# Patient Record
Sex: FEMALE | Race: WHITE | NOT HISPANIC OR LATINO
[De-identification: names, ages, dates, MRNs, and addresses within clinical notes are randomized per-mention and may not be internally consistent; named-entity substitution may affect disease eponyms.]

---

## 2019-10-03 ENCOUNTER — APPOINTMENT (OUTPATIENT)
Dept: OTOLARYNGOLOGY | Facility: CLINIC | Age: 24
End: 2019-10-03

## 2019-10-08 ENCOUNTER — APPOINTMENT (OUTPATIENT)
Dept: OTOLARYNGOLOGY | Facility: CLINIC | Age: 24
End: 2019-10-08
Payer: COMMERCIAL

## 2019-10-08 VITALS
SYSTOLIC BLOOD PRESSURE: 111 MMHG | WEIGHT: 230 LBS | BODY MASS INDEX: 36.96 KG/M2 | DIASTOLIC BLOOD PRESSURE: 83 MMHG | HEART RATE: 67 BPM | HEIGHT: 66 IN

## 2019-10-08 DIAGNOSIS — Z80.9 FAMILY HISTORY OF MALIGNANT NEOPLASM, UNSPECIFIED: ICD-10-CM

## 2019-10-08 DIAGNOSIS — Z83.3 FAMILY HISTORY OF DIABETES MELLITUS: ICD-10-CM

## 2019-10-08 DIAGNOSIS — Z78.9 OTHER SPECIFIED HEALTH STATUS: ICD-10-CM

## 2019-10-08 PROCEDURE — 99204 OFFICE O/P NEW MOD 45 MIN: CPT

## 2019-10-08 NOTE — HISTORY OF PRESENT ILLNESS
[de-identified] : In early august patient was diagnosed with strep after having throat pain that was severe as well as thick muus and swelling of the tonsils. SHe was given two rounds of antibiotics even though her throat was never cultured and it took the second antibiotic for her throat ot improve. It returned about 2 weeks later so she went another doctor and SHe had a culture done this time which was negative. She was given doxycycline and sent to an ENT who said that she indeed needed the medication. SHe finished the doxycycline but 3 weeks ago everything returned. SHe had blood work done and another culture including a test for mono but all was clear. The most recent antibiotic was Augmentin. She frequently gets white mucus on the tonsils that looks like infection. SHe has minor throat tightness right now with swollen tonsils bilaterally. When she has a throat infection she gets moderate throat pain and foul smelling breath. SHe also has minor aer pain bilaterally that started with the throat pain

## 2019-10-08 NOTE — PROCEDURE
[FreeTextEntry1] : throat culture [FreeTextEntry2] : chronic tonsillitis  [FreeTextEntry3] : throat culture taken and sent out

## 2019-10-08 NOTE — PHYSICAL EXAM
[Midline] : trachea located in midline position [Normal] : no rashes [de-identified] : hypertorhic and cryptic

## 2019-10-08 NOTE — REVIEW OF SYSTEMS
[Throat Pain] : throat pain [Negative] : Heme/Lymph [de-identified] : c [de-identified] : chornic throat pain and infections

## 2019-10-08 NOTE — ASSESSMENT
[FreeTextEntry1] : Patient knows started at [facility] been suffering recurrent tonsil issues combination of stones viral infections of tongue exudative tonsillitis cultures of always been negative and examination she has 2 fairly large cryptic tonsils put her on a Medrol Dosepak and Flonase nasal spray after nasal endoscopy showed inflamed adenoid tissue as well she will followup and see us in one month and will determine at that time if any additional intervention would be indicated.

## 2019-10-15 LAB — BACTERIA THROAT CULT: NORMAL

## 2019-11-12 ENCOUNTER — TRANSCRIPTION ENCOUNTER (OUTPATIENT)
Age: 24
End: 2019-11-12

## 2019-11-12 ENCOUNTER — APPOINTMENT (OUTPATIENT)
Dept: OTOLARYNGOLOGY | Facility: CLINIC | Age: 24
End: 2019-11-12
Payer: COMMERCIAL

## 2019-11-12 VITALS
SYSTOLIC BLOOD PRESSURE: 125 MMHG | WEIGHT: 230 LBS | DIASTOLIC BLOOD PRESSURE: 81 MMHG | HEART RATE: 66 BPM | BODY MASS INDEX: 36.96 KG/M2 | HEIGHT: 66 IN

## 2019-11-12 DIAGNOSIS — J35.2 HYPERTROPHY OF ADENOIDS: ICD-10-CM

## 2019-11-12 PROCEDURE — 31575 DIAGNOSTIC LARYNGOSCOPY: CPT

## 2019-11-12 PROCEDURE — 99214 OFFICE O/P EST MOD 30 MIN: CPT | Mod: 25

## 2019-11-12 NOTE — ASSESSMENT
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 A fiberoptic evaluation of the larynx once again reassured her consideration for reflux evaluation should be considered not loosely I told her she should try to lose some weight. She does have a full base of tongue does not the symptoms that she is complaining about and I encouraged to followup with us as needed and to possibly seek some stress management help.

## 2019-11-12 NOTE — HISTORY OF PRESENT ILLNESS
[de-identified] : PAtient reports that the steroids helped to bring the swelling of the throat down but she continues ot have some random pain in the neck and throat. SHe still feels a tightness in the throat on occasion and has seen her PCP several times. SHe still feels like the tonsils look chronically infected. SHe does not have any current nasal congestion or runny nose . SHe has still been using her nasal sprays with moderate benefit. SHe states that the pain she is having in her throat now feels different. Its more on the outside of her neck and tender to touch the glands under the jaw. She did nto have this prior when she was having the frequent throat infections. SHe was worked up by the rheumatologist and was found to be ELODIA positive but other things were negative.

## 2019-11-12 NOTE — PHYSICAL EXAM
[Midline] : trachea located in midline position [Normal] : no rashes [de-identified] : hypertrophic  and cryptic

## 2019-12-05 ENCOUNTER — APPOINTMENT (OUTPATIENT)
Dept: OTOLARYNGOLOGY | Facility: CLINIC | Age: 24
End: 2019-12-05
Payer: COMMERCIAL

## 2019-12-05 VITALS
DIASTOLIC BLOOD PRESSURE: 86 MMHG | HEART RATE: 90 BPM | SYSTOLIC BLOOD PRESSURE: 127 MMHG | BODY MASS INDEX: 36.96 KG/M2 | WEIGHT: 230 LBS | HEIGHT: 66 IN

## 2019-12-05 DIAGNOSIS — M54.2 CERVICALGIA: ICD-10-CM

## 2019-12-05 PROCEDURE — 99214 OFFICE O/P EST MOD 30 MIN: CPT | Mod: 25

## 2019-12-05 PROCEDURE — 31575 DIAGNOSTIC LARYNGOSCOPY: CPT

## 2019-12-05 NOTE — PHYSICAL EXAM
[Midline] : trachea located in midline position [Normal] : no rashes [de-identified] : hypertrophic  and cryptic

## 2019-12-05 NOTE — ASSESSMENT
[FreeTextEntry1] : Patient follows up doing much better at work however continues to have some discomfort comfort in her anterior neck cousin recently diagnosed with thyroid cancer obviously increased to concern she's here for evaluation no palpable masses or in the neck identifiable however she has a very thick neck and [facility] ultrasound of her thyroid consideration for a CAT scan will be given if any abnormality is found on the thyroid scan. Fiberoptic evaluation of the larynx at her request once again showed no evidence of any abnormality just severely very prominent normal-appearing lingual tonsil region. We discussed as follows she is negative and anterior neck pain continues consideration for neurological orthopedic evaluation should be considered.

## 2019-12-05 NOTE — HISTORY OF PRESENT ILLNESS
[de-identified] : Patient continues to have throat pain and anterior neck pain after 2 months. The antibitoics and steroids she took back in October only temporarily helped but did not change or improve her neck tenderness. SHe had her blood testing for the thyroid last month and was told that all was normal but she still feels like something is stuck in her throat. SHe continues to have pain in the front of the throat/neck everyday for the last 2 months. She states that the pain moves and is sometimes higher in the neck, and then lower near the collarbones. SHe has a throbbing in the neck mostly and then randomly will get pain that is sharp and stabbing. THere is associated ear pain that comes and goes as well when she has the pain in the throat and this pain is also sharp and stabbing when it happens. She mostly feels like someone is pressing two fingers against her trachea

## 2019-12-08 ENCOUNTER — FORM ENCOUNTER (OUTPATIENT)
Age: 24
End: 2019-12-08

## 2019-12-09 ENCOUNTER — APPOINTMENT (OUTPATIENT)
Dept: ULTRASOUND IMAGING | Facility: CLINIC | Age: 24
End: 2019-12-09
Payer: COMMERCIAL

## 2019-12-09 ENCOUNTER — OUTPATIENT (OUTPATIENT)
Dept: OUTPATIENT SERVICES | Facility: HOSPITAL | Age: 24
LOS: 1 days | End: 2019-12-09
Payer: COMMERCIAL

## 2019-12-09 DIAGNOSIS — Z00.8 ENCOUNTER FOR OTHER GENERAL EXAMINATION: ICD-10-CM

## 2019-12-09 PROCEDURE — 76536 US EXAM OF HEAD AND NECK: CPT

## 2019-12-09 PROCEDURE — 76536 US EXAM OF HEAD AND NECK: CPT | Mod: 26

## 2019-12-10 ENCOUNTER — CLINICAL ADVICE (OUTPATIENT)
Age: 24
End: 2019-12-10

## 2019-12-16 ENCOUNTER — TRANSCRIPTION ENCOUNTER (OUTPATIENT)
Age: 24
End: 2019-12-16

## 2019-12-18 ENCOUNTER — APPOINTMENT (OUTPATIENT)
Dept: ENDOCRINOLOGY | Facility: CLINIC | Age: 24
End: 2019-12-18

## 2019-12-24 ENCOUNTER — APPOINTMENT (OUTPATIENT)
Dept: ENDOCRINOLOGY | Facility: CLINIC | Age: 24
End: 2019-12-24

## 2020-01-05 ENCOUNTER — TRANSCRIPTION ENCOUNTER (OUTPATIENT)
Age: 25
End: 2020-01-05

## 2020-01-08 ENCOUNTER — RESULT REVIEW (OUTPATIENT)
Age: 25
End: 2020-01-08

## 2020-02-05 ENCOUNTER — TRANSCRIPTION ENCOUNTER (OUTPATIENT)
Age: 25
End: 2020-02-05

## 2020-02-11 ENCOUNTER — APPOINTMENT (OUTPATIENT)
Dept: OTOLARYNGOLOGY | Facility: CLINIC | Age: 25
End: 2020-02-11
Payer: COMMERCIAL

## 2020-02-11 VITALS
DIASTOLIC BLOOD PRESSURE: 105 MMHG | HEIGHT: 66 IN | WEIGHT: 230 LBS | SYSTOLIC BLOOD PRESSURE: 145 MMHG | HEART RATE: 85 BPM | BODY MASS INDEX: 36.96 KG/M2

## 2020-02-11 PROCEDURE — 31575 DIAGNOSTIC LARYNGOSCOPY: CPT

## 2020-02-11 PROCEDURE — 99214 OFFICE O/P EST MOD 30 MIN: CPT | Mod: 25

## 2020-02-11 NOTE — REVIEW OF SYSTEMS
[Nasal Congestion] : nasal congestion [Throat Pain] : throat pain [Ear Pain] : ear pain [Negative] : Heme/Lymph

## 2020-02-11 NOTE — ASSESSMENT
[FreeTextEntry1] : Patient with what appears to be an exudative tonsillitis been on Augmentin now for 5 days pain is still bothersome on examination she does have exudative tonsillitis bilaterally she apparently had a model ruled out about 2 weeks ago in the past this is responded to steroids as well again were Medrol Dosepak if this were to persist we may switch her to clindamycin we once again had a discussion about possible tonsillectomy in the future fiberoptic evaluation of the larynx showed no extension of parapharyngeal space lingual tonsils and epiglottis all within normal limits.

## 2020-02-11 NOTE — PHYSICAL EXAM
[Midline] : trachea located in midline position [Normal] : orientation to person, place, and time: normal [de-identified] : minor edema of the left cervical II lymph nodes  [de-identified] : purulence bilaterally with edema and erythema

## 2020-02-11 NOTE — HISTORY OF PRESENT ILLNESS
[de-identified] : Patient went to her doctor on monday for throat pain on the left side that was moderate and she had swelling of the nodes on the left side of the nexk as well. Her PCP didn’t think anything of this but she woke up the next morning with pus on both tonsils and severe throat pain. She was put on augmentin on Tuesday and had been taking it twice a day for a week and still feels severe throat pain bilaterally that is sharp and stabbing with swallowing. THe pain is radiating to the ears bilaterally with a minor headache. SHe has nasal congsetion as well so she has been mouth breathing which makes the throat dry and makes it hard to sleep. SHe still has mionr swelling of the left neck. They did a rapid strep at her doctors office that was negative. She had a mono test done two weeks ago (blood work) that came back negative as well.

## 2020-02-26 ENCOUNTER — EMERGENCY (EMERGENCY)
Facility: HOSPITAL | Age: 25
LOS: 1 days | Discharge: HOME | End: 2020-02-26
Admitting: EMERGENCY MEDICINE
Payer: COMMERCIAL

## 2020-02-26 VITALS
SYSTOLIC BLOOD PRESSURE: 133 MMHG | OXYGEN SATURATION: 100 % | RESPIRATION RATE: 18 BRPM | HEART RATE: 91 BPM | DIASTOLIC BLOOD PRESSURE: 80 MMHG | TEMPERATURE: 98 F

## 2020-02-26 LAB
ALBUMIN SERPL ELPH-MCNC: 4.5 G/DL — SIGNIFICANT CHANGE UP (ref 3.3–5)
ALP SERPL-CCNC: 49 U/L — SIGNIFICANT CHANGE UP (ref 40–120)
ALT FLD-CCNC: 24 U/L — SIGNIFICANT CHANGE UP (ref 4–33)
ANION GAP SERPL CALC-SCNC: 13 MMO/L — SIGNIFICANT CHANGE UP (ref 7–14)
APPEARANCE UR: CLEAR — SIGNIFICANT CHANGE UP
AST SERPL-CCNC: 24 U/L — SIGNIFICANT CHANGE UP (ref 4–32)
BASOPHILS # BLD AUTO: 0.03 K/UL — SIGNIFICANT CHANGE UP (ref 0–0.2)
BASOPHILS NFR BLD AUTO: 0.5 % — SIGNIFICANT CHANGE UP (ref 0–2)
BILIRUB SERPL-MCNC: 0.6 MG/DL — SIGNIFICANT CHANGE UP (ref 0.2–1.2)
BILIRUB UR-MCNC: NEGATIVE — SIGNIFICANT CHANGE UP
BLOOD UR QL VISUAL: NEGATIVE — SIGNIFICANT CHANGE UP
BUN SERPL-MCNC: 17 MG/DL — SIGNIFICANT CHANGE UP (ref 7–23)
CALCIUM SERPL-MCNC: 9.2 MG/DL — SIGNIFICANT CHANGE UP (ref 8.4–10.5)
CHLORIDE SERPL-SCNC: 105 MMOL/L — SIGNIFICANT CHANGE UP (ref 98–107)
CO2 SERPL-SCNC: 21 MMOL/L — LOW (ref 22–31)
COLOR SPEC: YELLOW — SIGNIFICANT CHANGE UP
CREAT SERPL-MCNC: 0.85 MG/DL — SIGNIFICANT CHANGE UP (ref 0.5–1.3)
EOSINOPHIL # BLD AUTO: 0.02 K/UL — SIGNIFICANT CHANGE UP (ref 0–0.5)
EOSINOPHIL NFR BLD AUTO: 0.3 % — SIGNIFICANT CHANGE UP (ref 0–6)
GLUCOSE SERPL-MCNC: 87 MG/DL — SIGNIFICANT CHANGE UP (ref 70–99)
GLUCOSE UR-MCNC: NEGATIVE — SIGNIFICANT CHANGE UP
HCT VFR BLD CALC: 37.8 % — SIGNIFICANT CHANGE UP (ref 34.5–45)
HGB BLD-MCNC: 13.1 G/DL — SIGNIFICANT CHANGE UP (ref 11.5–15.5)
IMM GRANULOCYTES NFR BLD AUTO: 0.3 % — SIGNIFICANT CHANGE UP (ref 0–1.5)
KETONES UR-MCNC: NEGATIVE — SIGNIFICANT CHANGE UP
LEUKOCYTE ESTERASE UR-ACNC: NEGATIVE — SIGNIFICANT CHANGE UP
LYMPHOCYTES # BLD AUTO: 2.88 K/UL — SIGNIFICANT CHANGE UP (ref 1–3.3)
LYMPHOCYTES # BLD AUTO: 45.4 % — HIGH (ref 13–44)
MCHC RBC-ENTMCNC: 30.5 PG — SIGNIFICANT CHANGE UP (ref 27–34)
MCHC RBC-ENTMCNC: 34.7 % — SIGNIFICANT CHANGE UP (ref 32–36)
MCV RBC AUTO: 87.9 FL — SIGNIFICANT CHANGE UP (ref 80–100)
MONOCYTES # BLD AUTO: 0.65 K/UL — SIGNIFICANT CHANGE UP (ref 0–0.9)
MONOCYTES NFR BLD AUTO: 10.2 % — SIGNIFICANT CHANGE UP (ref 2–14)
NEUTROPHILS # BLD AUTO: 2.75 K/UL — SIGNIFICANT CHANGE UP (ref 1.8–7.4)
NEUTROPHILS NFR BLD AUTO: 43.3 % — SIGNIFICANT CHANGE UP (ref 43–77)
NITRITE UR-MCNC: NEGATIVE — SIGNIFICANT CHANGE UP
NRBC # FLD: 0 K/UL — SIGNIFICANT CHANGE UP (ref 0–0)
PH UR: 6 — SIGNIFICANT CHANGE UP (ref 5–8)
PLATELET # BLD AUTO: 205 K/UL — SIGNIFICANT CHANGE UP (ref 150–400)
PMV BLD: 9.1 FL — SIGNIFICANT CHANGE UP (ref 7–13)
POTASSIUM SERPL-MCNC: 3.8 MMOL/L — SIGNIFICANT CHANGE UP (ref 3.5–5.3)
POTASSIUM SERPL-SCNC: 3.8 MMOL/L — SIGNIFICANT CHANGE UP (ref 3.5–5.3)
PROT SERPL-MCNC: 7.6 G/DL — SIGNIFICANT CHANGE UP (ref 6–8.3)
PROT UR-MCNC: 100 — HIGH
RBC # BLD: 4.3 M/UL — SIGNIFICANT CHANGE UP (ref 3.8–5.2)
RBC # FLD: 13.2 % — SIGNIFICANT CHANGE UP (ref 10.3–14.5)
SODIUM SERPL-SCNC: 139 MMOL/L — SIGNIFICANT CHANGE UP (ref 135–145)
SP GR SPEC: 1.03 — SIGNIFICANT CHANGE UP (ref 1–1.04)
UROBILINOGEN FLD QL: 0.2 — SIGNIFICANT CHANGE UP
WBC # BLD: 6.35 K/UL — SIGNIFICANT CHANGE UP (ref 3.8–10.5)
WBC # FLD AUTO: 6.35 K/UL — SIGNIFICANT CHANGE UP (ref 3.8–10.5)
WBC UR QL: SIGNIFICANT CHANGE UP (ref 0–?)

## 2020-02-26 PROCEDURE — 76830 TRANSVAGINAL US NON-OB: CPT | Mod: 26

## 2020-02-26 PROCEDURE — 99284 EMERGENCY DEPT VISIT MOD MDM: CPT

## 2020-02-26 NOTE — ED PROVIDER NOTE - CLINICAL SUMMARY MEDICAL DECISION MAKING FREE TEXT BOX
23 yo female c no sig pmhx presents to ED c/o lower abdominal cramping x 1.5 weeks.  no associated GI symptoms; pt recently had IUP expelled, +mild tenderness to suprapubic region, will obtain labs, ua, transvaginal US, pt did not want anything for pain at this time.

## 2020-02-26 NOTE — ED PROVIDER NOTE - NSFOLLOWUPINSTRUCTIONS_ED_ALL_ED_FT
Take motrin 600mg every 8 hours with food as needed for pain.  follow up with your GYN within 1-2 weeks if pain persists.  Return to ED for any fever, vomiting or worsening abdominal pain.

## 2020-02-26 NOTE — ED PROVIDER NOTE - PATIENT PORTAL LINK FT
You can access the FollowMyHealth Patient Portal offered by [facility] by registering at the following website: [email or web address]. By joining [employer]’s FollowMyHealth portal, you will also be able to view your health information using other applications (apps) compatible with our system.

## 2020-02-26 NOTE — ED PROVIDER NOTE - OBJECTIVE STATEMENT
25 yo female c no sig pmhx presents to ED c/o lower abdominal cramping x 1.5 weeks.  Pt states had a tonisllitis 2 weeks ago finished course of antibx and steroids.  Pt also states 2 weeks ago had her IUD "expelled" out of her uterus which prompted her GYN to take it out.  LMP was 2/8 which pt states was heavier and last longer than usual.  Currently pain 6/10 and tolerable.  Pt denies any fevers, n/v/d, flank pain, urinary sx, vaginal bleeding or discharge.

## 2020-03-04 ENCOUNTER — APPOINTMENT (OUTPATIENT)
Dept: DERMATOLOGY | Facility: CLINIC | Age: 25
End: 2020-03-04
Payer: COMMERCIAL

## 2020-03-04 ENCOUNTER — LABORATORY RESULT (OUTPATIENT)
Age: 25
End: 2020-03-04

## 2020-03-04 VITALS — HEIGHT: 66 IN | WEIGHT: 215 LBS | BODY MASS INDEX: 34.55 KG/M2

## 2020-03-04 DIAGNOSIS — Z12.83 ENCOUNTER FOR SCREENING FOR MALIGNANT NEOPLASM OF SKIN: ICD-10-CM

## 2020-03-04 DIAGNOSIS — Z87.898 PERSONAL HISTORY OF OTHER SPECIFIED CONDITIONS: ICD-10-CM

## 2020-03-04 PROCEDURE — 12001 RPR S/N/AX/GEN/TRNK 2.5CM/<: CPT | Mod: GC,59

## 2020-03-04 PROCEDURE — 99203 OFFICE O/P NEW LOW 30 MIN: CPT | Mod: GC,25

## 2020-03-04 PROCEDURE — 11402 EXC TR-EXT B9+MARG 1.1-2 CM: CPT | Mod: GC,59

## 2020-03-11 ENCOUNTER — TRANSCRIPTION ENCOUNTER (OUTPATIENT)
Age: 25
End: 2020-03-11

## 2020-03-18 ENCOUNTER — APPOINTMENT (OUTPATIENT)
Dept: DERMATOLOGY | Facility: CLINIC | Age: 25
End: 2020-03-18
Payer: COMMERCIAL

## 2020-03-18 VITALS — HEIGHT: 66 IN | BODY MASS INDEX: 34.55 KG/M2 | WEIGHT: 215 LBS

## 2020-03-18 DIAGNOSIS — Z86.018 PERSONAL HISTORY OF OTHER BENIGN NEOPLASM: ICD-10-CM

## 2020-03-18 DIAGNOSIS — D22.62 MELANOCYTIC NEVI OF LEFT UPPER LIMB, INCLUDING SHOULDER: ICD-10-CM

## 2020-03-18 DIAGNOSIS — D22.9 MELANOCYTIC NEVI, UNSPECIFIED: ICD-10-CM

## 2020-03-18 DIAGNOSIS — L72.0 EPIDERMAL CYST: ICD-10-CM

## 2020-03-18 PROCEDURE — 99213 OFFICE O/P EST LOW 20 MIN: CPT | Mod: 24

## 2020-04-25 ENCOUNTER — MESSAGE (OUTPATIENT)
Age: 25
End: 2020-04-25

## 2020-04-27 ENCOUNTER — TRANSCRIPTION ENCOUNTER (OUTPATIENT)
Age: 25
End: 2020-04-27

## 2020-05-17 ENCOUNTER — TRANSCRIPTION ENCOUNTER (OUTPATIENT)
Age: 25
End: 2020-05-17

## 2020-05-18 ENCOUNTER — APPOINTMENT (OUTPATIENT)
Dept: DISASTER EMERGENCY | Facility: CLINIC | Age: 25
End: 2020-05-18

## 2020-05-18 ENCOUNTER — LABORATORY RESULT (OUTPATIENT)
Age: 25
End: 2020-05-18

## 2020-05-22 LAB
SARS-COV-2 IGG SERPL IA-ACNC: 0 INDEX
SARS-COV-2 IGG SERPL QL IA: NEGATIVE

## 2020-05-26 ENCOUNTER — APPOINTMENT (OUTPATIENT)
Dept: DISASTER EMERGENCY | Facility: HOSPITAL | Age: 25
End: 2020-05-26

## 2020-09-14 ENCOUNTER — TRANSCRIPTION ENCOUNTER (OUTPATIENT)
Age: 25
End: 2020-09-14

## 2020-09-22 ENCOUNTER — APPOINTMENT (OUTPATIENT)
Dept: OTOLARYNGOLOGY | Facility: CLINIC | Age: 25
End: 2020-09-22
Payer: COMMERCIAL

## 2020-09-22 VITALS
WEIGHT: 210 LBS | TEMPERATURE: 98.1 F | HEART RATE: 92 BPM | DIASTOLIC BLOOD PRESSURE: 96 MMHG | HEIGHT: 66 IN | SYSTOLIC BLOOD PRESSURE: 146 MMHG | BODY MASS INDEX: 33.75 KG/M2

## 2020-09-22 DIAGNOSIS — H92.03 OTALGIA, BILATERAL: ICD-10-CM

## 2020-09-22 DIAGNOSIS — H93.13 TINNITUS, BILATERAL: ICD-10-CM

## 2020-09-22 PROCEDURE — 31575 DIAGNOSTIC LARYNGOSCOPY: CPT

## 2020-09-22 PROCEDURE — 92567 TYMPANOMETRY: CPT

## 2020-09-22 PROCEDURE — 99214 OFFICE O/P EST MOD 30 MIN: CPT | Mod: 25

## 2020-09-22 PROCEDURE — 92557 COMPREHENSIVE HEARING TEST: CPT

## 2020-09-22 NOTE — PHYSICAL EXAM
[Midline] : trachea located in midline position [Normal] : palatine tonsils are normal [de-identified] : minor erythem and edema

## 2020-09-22 NOTE — ASSESSMENT
[FreeTextEntry1] : Patient complaining of some once again throat discomfort she got better on steroids and antibiotics but once again after that symptoms seem to have recurred fiberoptically once again she is remains irritated so I gave her another course of Augmentin as well as a Medrol Dosepak to see if this will nip this in the bud.  She also is complaining of some discomfort in her ear seems to be TMJ related told her to take some Motrin or Advil we did an audiogram which confirm essentially normal hearing.  I reassured her there is no evidence of any herpes or any other viral issues in her throat which alleviated some of her concerns.

## 2020-09-22 NOTE — HISTORY OF PRESENT ILLNESS
[de-identified] : Patient has had progressively worsening bilateral ear pain for the last few weeks. The left ear is worse than the right. The pain is sharp an d stabbing and it can lasts for several hours. SHe has never been told that she has TMJ and does not know if she grinds her teeth. SHe has not had any pulsations in the ears or dizziness and denies any issues with ear drainage. She does admit that when she has the severe ear pain there is an associated high pitched ringing in both ears that is intermittent. SHe has not had any recent throat pain or throat infections but still feelf ullness all the time

## 2020-10-10 ENCOUNTER — TRANSCRIPTION ENCOUNTER (OUTPATIENT)
Age: 25
End: 2020-10-10

## 2020-10-12 ENCOUNTER — APPOINTMENT (OUTPATIENT)
Dept: OTOLARYNGOLOGY | Facility: CLINIC | Age: 25
End: 2020-10-12
Payer: COMMERCIAL

## 2020-10-12 VITALS
BODY MASS INDEX: 33.75 KG/M2 | SYSTOLIC BLOOD PRESSURE: 134 MMHG | DIASTOLIC BLOOD PRESSURE: 87 MMHG | HEART RATE: 103 BPM | HEIGHT: 66 IN | WEIGHT: 210 LBS | TEMPERATURE: 98 F

## 2020-10-12 PROCEDURE — 99214 OFFICE O/P EST MOD 30 MIN: CPT

## 2020-10-12 NOTE — HISTORY OF PRESENT ILLNESS
[de-identified] : Patient was at work two nights ago and she started to have moderate throat pain. SHe feels like it is enlarged and hard to swallow since this started. This has happened now several times a year, every year. SHe does not have any issues with eating or drinking but it is uncomfortable. She finished antibiotics and steroids for her ear about two weeeks ago and had been doing well

## 2020-10-12 NOTE — ASSESSMENT
[FreeTextEntry1] : Can with what appears to be a viral tonsillitis happened fairly rapidly bilaterally exudative with some hemorrhagic regions we did a culture to cultures 1 for bacterial and 1 for potential gonorrhea and chlamydia.  As well as sent her for mono test.  Put her on Augmentin once again as well as a Medrol Dosepak that seemed to have worked for her in the past obviously consideration for tonsillectomy will be given soon when she heals..

## 2020-10-12 NOTE — PHYSICAL EXAM
[Midline] : trachea located in midline position [Normal] : no rashes [de-identified] : hyoertrophic, edematous

## 2020-10-15 LAB — BACTERIA THROAT CULT: NORMAL

## 2020-10-21 ENCOUNTER — TRANSCRIPTION ENCOUNTER (OUTPATIENT)
Age: 25
End: 2020-10-21

## 2020-10-21 LAB — HETEROPH AB SER QL: NEGATIVE

## 2020-10-26 ENCOUNTER — APPOINTMENT (OUTPATIENT)
Dept: OTOLARYNGOLOGY | Facility: CLINIC | Age: 25
End: 2020-10-26
Payer: COMMERCIAL

## 2020-10-26 VITALS
TEMPERATURE: 97.2 F | SYSTOLIC BLOOD PRESSURE: 136 MMHG | DIASTOLIC BLOOD PRESSURE: 90 MMHG | BODY MASS INDEX: 33.75 KG/M2 | HEIGHT: 66 IN | WEIGHT: 210 LBS

## 2020-10-26 PROCEDURE — 99214 OFFICE O/P EST MOD 30 MIN: CPT

## 2020-10-26 PROCEDURE — 99072 ADDL SUPL MATRL&STAF TM PHE: CPT

## 2020-10-26 NOTE — HISTORY OF PRESENT ILLNESS
[de-identified] : Patient took the steroids and the antibiotics after the last visit and was doing well but then this past saturday she started to have severe throat pain again. She has sharp stabbing with swallowing  and called the on call service and was re prescribed medication that she has not started yet. She has been taking motrin for pain since this past saturday but continues to have throat pain.

## 2020-10-26 NOTE — ASSESSMENT
[FreeTextEntry1] : And what appears to be a viral pharyngitis culture was taken of her tonsils that do not look exudative Motrin seems to be helping her a lot pain voice we gave she was given antibiotics and steroids again over the weekend she held off I encouraged her to hold off until the culture is back most likely we may proceed to taking out her tonsils in the near future and she understands that will guarantee that viral pharyngitis is well come back but may certainly diminish the frequency.

## 2020-10-26 NOTE — PHYSICAL EXAM
[Midline] : trachea located in midline position [Normal] : no rashes [de-identified] : erythematous bilaterally

## 2020-10-28 ENCOUNTER — TRANSCRIPTION ENCOUNTER (OUTPATIENT)
Age: 25
End: 2020-10-28

## 2020-10-30 LAB — BACTERIA THROAT CULT: NORMAL

## 2020-11-19 ENCOUNTER — TRANSCRIPTION ENCOUNTER (OUTPATIENT)
Age: 25
End: 2020-11-19

## 2020-12-01 ENCOUNTER — TRANSCRIPTION ENCOUNTER (OUTPATIENT)
Age: 25
End: 2020-12-01

## 2020-12-08 ENCOUNTER — TRANSCRIPTION ENCOUNTER (OUTPATIENT)
Age: 25
End: 2020-12-08

## 2020-12-20 ENCOUNTER — TRANSCRIPTION ENCOUNTER (OUTPATIENT)
Age: 25
End: 2020-12-20

## 2020-12-30 ENCOUNTER — TRANSCRIPTION ENCOUNTER (OUTPATIENT)
Age: 25
End: 2020-12-30

## 2021-01-19 ENCOUNTER — APPOINTMENT (OUTPATIENT)
Dept: OTOLARYNGOLOGY | Facility: CLINIC | Age: 26
End: 2021-01-19
Payer: COMMERCIAL

## 2021-01-19 VITALS
SYSTOLIC BLOOD PRESSURE: 144 MMHG | HEIGHT: 66 IN | TEMPERATURE: 98 F | DIASTOLIC BLOOD PRESSURE: 97 MMHG | BODY MASS INDEX: 33.75 KG/M2 | WEIGHT: 210 LBS | HEART RATE: 107 BPM

## 2021-01-19 DIAGNOSIS — J35.1 HYPERTROPHY OF TONSILS: ICD-10-CM

## 2021-01-19 PROCEDURE — 99214 OFFICE O/P EST MOD 30 MIN: CPT

## 2021-01-19 PROCEDURE — 99072 ADDL SUPL MATRL&STAF TM PHE: CPT

## 2021-01-19 NOTE — PHYSICAL EXAM
[Midline] : trachea located in midline position [Normal] : no rashes [de-identified] : erythematous bilaterally

## 2021-01-19 NOTE — HISTORY OF PRESENT ILLNESS
[de-identified] : Patient finally improved after the last severe throst infection in October. SHe has continued to have recurrent throat pain and it radiates to the ears on occasion. SHe continues to take antibitoics when she has an active infection but the symptoms always return.

## 2021-01-19 NOTE — ASSESSMENT
[FreeTextEntry1] : PAtient returns with continued throat pain on and off since October. She has had constant throat infections and recurrent throat discomfort and swelling since we first saw her in 2019. Today on physical examination her tonsils appear chronically infected and unhealthy. As a result of the chronic nature of this issue discussed options with patient of continuing to treat only active infections with antibnitoics and or steroids, or to move forward with tonsillectomy. We both agree that at this point she would benefit from tonsil removal so we will get the ball rolling to get it approved by insurance. In the meantime, we gave her another round of augmentin and steroids as a last effort to reduce the swelling and irritation of the tonsils.  This discussed also risks and benefits of surgery in the near future including pros and cons of another course of antibiotics and steroids in great detail.

## 2021-01-19 NOTE — REVIEW OF SYSTEMS
[Throat Pain] : throat pain [Negative] : Heme/Lymph [de-identified] : recurrent throat infections

## 2021-01-23 ENCOUNTER — TRANSCRIPTION ENCOUNTER (OUTPATIENT)
Age: 26
End: 2021-01-23

## 2021-01-25 ENCOUNTER — TRANSCRIPTION ENCOUNTER (OUTPATIENT)
Age: 26
End: 2021-01-25

## 2021-02-08 ENCOUNTER — TRANSCRIPTION ENCOUNTER (OUTPATIENT)
Age: 26
End: 2021-02-08

## 2021-02-15 ENCOUNTER — TRANSCRIPTION ENCOUNTER (OUTPATIENT)
Age: 26
End: 2021-02-15

## 2021-02-24 ENCOUNTER — NON-APPOINTMENT (OUTPATIENT)
Age: 26
End: 2021-02-24

## 2021-03-19 ENCOUNTER — OUTPATIENT (OUTPATIENT)
Dept: OUTPATIENT SERVICES | Facility: HOSPITAL | Age: 26
LOS: 1 days | End: 2021-03-19
Payer: COMMERCIAL

## 2021-03-19 VITALS
HEART RATE: 97 BPM | TEMPERATURE: 98 F | WEIGHT: 225.09 LBS | DIASTOLIC BLOOD PRESSURE: 84 MMHG | SYSTOLIC BLOOD PRESSURE: 126 MMHG | OXYGEN SATURATION: 97 % | RESPIRATION RATE: 16 BRPM | HEIGHT: 66 IN

## 2021-03-19 DIAGNOSIS — J35.1 HYPERTROPHY OF TONSILS: ICD-10-CM

## 2021-03-19 DIAGNOSIS — N75.0 CYST OF BARTHOLIN'S GLAND: Chronic | ICD-10-CM

## 2021-03-19 DIAGNOSIS — R00.2 PALPITATIONS: ICD-10-CM

## 2021-03-19 LAB
HCG UR QL: NEGATIVE — SIGNIFICANT CHANGE UP
HCT VFR BLD CALC: 40.4 % — SIGNIFICANT CHANGE UP (ref 34.5–45)
HGB BLD-MCNC: 14.1 G/DL — SIGNIFICANT CHANGE UP (ref 11.5–15.5)
MCHC RBC-ENTMCNC: 30.5 PG — SIGNIFICANT CHANGE UP (ref 27–34)
MCHC RBC-ENTMCNC: 34.9 GM/DL — SIGNIFICANT CHANGE UP (ref 32–36)
MCV RBC AUTO: 87.3 FL — SIGNIFICANT CHANGE UP (ref 80–100)
NRBC # BLD: 0 /100 WBCS — SIGNIFICANT CHANGE UP
NRBC # FLD: 0 K/UL — SIGNIFICANT CHANGE UP
PLATELET # BLD AUTO: 264 K/UL — SIGNIFICANT CHANGE UP (ref 150–400)
RBC # BLD: 4.63 M/UL — SIGNIFICANT CHANGE UP (ref 3.8–5.2)
RBC # FLD: 12.7 % — SIGNIFICANT CHANGE UP (ref 10.3–14.5)
WBC # BLD: 10.61 K/UL — HIGH (ref 3.8–10.5)
WBC # FLD AUTO: 10.61 K/UL — HIGH (ref 3.8–10.5)

## 2021-03-19 PROCEDURE — 93010 ELECTROCARDIOGRAM REPORT: CPT

## 2021-03-19 NOTE — H&P PST ADULT - NSICDXFAMILYHX_GEN_ALL_CORE_FT
FAMILY HISTORY:  Father  Still living? Unknown  FH: HTN (hypertension), Age at diagnosis: Age Unknown    Mother  Still living? Unknown  Family history of scleroderma, Age at diagnosis: Age Unknown  FH: HTN (hypertension), Age at diagnosis: Age Unknown

## 2021-03-19 NOTE — H&P PST ADULT - NSICDXPASTMEDICALHX_GEN_ALL_CORE_FT
PAST MEDICAL HISTORY:  Erythema multiforme bullosum (Melchor Long syndrome) age 8    H/O tonsillitis      PAST MEDICAL HISTORY:  Erythema multiforme bullosum (Melchor Long syndrome) age 8    H/O tonsillitis     Obesity

## 2021-03-19 NOTE — H&P PST ADULT - CARDIOVASCULAR COMMENTS
occasional palpitations since 4/2020 - had evaluation by cardiologist (stress/echo/holtor monitor) and states findings were normal

## 2021-03-19 NOTE — H&P PST ADULT - HISTORY OF PRESENT ILLNESS
25 year old female with c/o frequent tonsillitis over the past 1.5 years. Pt presents today for presurgical evaluation for .. 25 year old female with c/o frequent tonsillitis over the past 1.5 years. Pt presents today for presurgical evaluation for Tonsillectomy.

## 2021-03-19 NOTE — H&P PST ADULT - NSICDXPROBLEM_GEN_ALL_CORE_FT
PROBLEM DIAGNOSES  Problem: Hypertrophy of tonsils  Assessment and Plan: Pt scheduled for surgery on 3/31/2021.  Pre-op instructions provided. Pt verbalized understanding.   Pepcid provided for GI prophylaxis.   Pt given urine specimen cup for ucg on admission.   Pt states she is already scheduled for preop COVID testing.        PROBLEM DIAGNOSES  Problem: Hypertrophy of tonsils  Assessment and Plan: Pt scheduled for surgery on 3/31/2021.  Pre-op instructions provided. Pt verbalized understanding.   Pepcid provided for GI prophylaxis.   Pt given urine specimen cup for ucg on admission.   Pt states she is already scheduled for preop COVID testing.     Problem: Palpitations  Assessment and Plan: Already had evaluation by cardiologist - requested last visit note.

## 2021-03-19 NOTE — H&P PST ADULT - TOBACCO USE
Campos Banerjee Patient Age: 8 year old  MESSAGE:   Pt Mother calling requesting to speak to a nurse.     Mother states that pt saw Dr. Doss today 11-05-19. Mother states that pharmacy gave her the nebulizer solution but not the inhaler. Mother would like to confirm medication for pt.     Message confirmed with caller    Call connected to [id number] Triage queue.  Routed to Provider's clinical pool.     WEIGHT AND HEIGHT:   Wt Readings from Last 1 Encounters:   11/05/19 24.2 kg (53 lb 6.4 oz) (65 %, Z= 0.40)*     * Growth percentiles are based on CDC (Boys, 2-20 Years) data.     Ht Readings from Last 1 Encounters:   07/09/18 3' 9\" (1.143 m) (64 %, Z= 0.35)*     * Growth percentiles are based on CDC (Boys, 2-20 Years) data.     BMI Readings from Last 1 Encounters:   07/09/18 17.36 kg/m² (90 %, Z= 1.28)*     * Growth percentiles are based on CDC (Boys, 2-20 Years) data.       ALLERGIES: no known allergies.  Current Outpatient Medications   Medication   • albuterol (VENTOLIN) (2.5 MG/3ML) 0.083% nebulizer solution   • Spacer/Aero-Hold Chamber Mask Misc   • amoxicillin-clavulanate (AUGMENTIN) 400-57 MG/5ML suspension   • prednisoLONE sod-phos (ORAPRED) 15 MG/5ML solution   • Spacer/Aero-Holding Chambers (AEROCHAMBER PLUS [facility] PEDIATRIC WITH MASK)     No current facility-administered medications for this visit.      PHARMACY to use:           Pharmacy preference(s) on file:   [id number] - [facility] [address]  [address] [address]  Phone: [phone] Fax: [phone]      CALL BACK INFO: Ok to leave response (including medical information) on answering machine  ROUTING: Patient's physician/staff        PCP: Chasidy Mejía MD         INS: Payor: [facility] / Plan: [id number] / Product Type: O MISC   PATIENT ADDRESS:  [address] [place] [id number]   Never smoker

## 2021-03-19 NOTE — H&P PST ADULT - LIVES WITH, PROFILE
Past Medical History:   Diagnosis Date    Anxiety     Arthritis     hands    Colon polyp     Hx of hypoglycemia     Major depressive disorder     Morphea     on back, not currently active    Osteopenia 11/26/2014    Pelvic fracture     left pubuc rami    PONV (postoperative nausea and vomiting)     Refractive error      Review of patient's allergies indicates:   Allergen Reactions    Corticosteroids (glucocorticoids) Itching and Anxiety     Severe anxiety (temporary near psychosis as recently as 4/15)       Ordering Physician: Cruz   Order Type: Written Order  Initial SNOT-20 score: 22      Treatment set:  Mix #1 (lot# [id number]) EXP:  03/20/19 Allergens: molds, mites, cockroach, cat, dog, feathers  Mix #2 (lot# [id number]) EXP:  03/20/19 Allergens: weeds  Mix #3 (lot# [id number]) EXP:  03/20/19 Allergens: trees      Manufactured by [employer]      At 1110 am gave 0.1 mL subcutaneously. Mix #1 (YELLOW VIAL) & Mix #2 (YELLOW VIAL) in left arm, mix #3 (YELLOW VIAL) in right arm.       Pt tolerated injection well. No complaints of pain or discomfort. Pt Instructed to remain in allergy department for 20 minutes. Patient verbalized understanding.       After 20 minutes, the patient was no longer in the waiting area.         
parents

## 2021-03-19 NOTE — H&P PST ADULT - ENMT COMMENTS
recently completed course of antibiotics and steroids for tonsillitis on 3/14/2021 (was prescribed by surgeon)

## 2021-03-27 DIAGNOSIS — Z01.818 ENCOUNTER FOR OTHER PREPROCEDURAL EXAMINATION: ICD-10-CM

## 2021-03-28 ENCOUNTER — APPOINTMENT (OUTPATIENT)
Dept: DISASTER EMERGENCY | Facility: CLINIC | Age: 26
End: 2021-03-28

## 2021-03-30 ENCOUNTER — TRANSCRIPTION ENCOUNTER (OUTPATIENT)
Age: 26
End: 2021-03-30

## 2021-03-30 VITALS
OXYGEN SATURATION: 98 % | SYSTOLIC BLOOD PRESSURE: 125 MMHG | DIASTOLIC BLOOD PRESSURE: 83 MMHG | WEIGHT: 225.09 LBS | HEIGHT: 66 IN | TEMPERATURE: 98 F | HEART RATE: 99 BPM | RESPIRATION RATE: 16 BRPM

## 2021-03-30 LAB — SARS-COV-2 N GENE NPH QL NAA+PROBE: NOT DETECTED

## 2021-03-31 ENCOUNTER — RESULT REVIEW (OUTPATIENT)
Age: 26
End: 2021-03-31

## 2021-03-31 ENCOUNTER — OUTPATIENT (OUTPATIENT)
Dept: OUTPATIENT SERVICES | Facility: HOSPITAL | Age: 26
LOS: 1 days | Discharge: HOME | End: 2021-03-31
Payer: COMMERCIAL

## 2021-03-31 ENCOUNTER — APPOINTMENT (OUTPATIENT)
Dept: OTOLARYNGOLOGY | Facility: AMBULATORY SURGERY CENTER | Age: 26
End: 2021-03-31

## 2021-03-31 VITALS
DIASTOLIC BLOOD PRESSURE: 65 MMHG | SYSTOLIC BLOOD PRESSURE: 122 MMHG | HEART RATE: 93 BPM | RESPIRATION RATE: 13 BRPM | TEMPERATURE: 98 F | OXYGEN SATURATION: 96 %

## 2021-03-31 DIAGNOSIS — N75.0 CYST OF BARTHOLIN'S GLAND: Chronic | ICD-10-CM

## 2021-03-31 DIAGNOSIS — J35.1 HYPERTROPHY OF TONSILS: ICD-10-CM

## 2021-03-31 PROCEDURE — 42821 REMOVE TONSILS AND ADENOIDS: CPT

## 2021-03-31 PROCEDURE — 88304 TISSUE EXAM BY PATHOLOGIST: CPT | Mod: 26

## 2021-03-31 RX ORDER — ERGOCALCIFEROL 1.25 MG/1
2000 CAPSULE ORAL
Qty: 0 | Refills: 0 | DISCHARGE

## 2021-03-31 RX ORDER — ASCORBIC ACID 60 MG
1 TABLET,CHEWABLE ORAL
Qty: 0 | Refills: 0 | DISCHARGE

## 2021-03-31 RX ORDER — ZINC SULFATE TAB 220 MG (50 MG ZINC EQUIVALENT) 220 (50 ZN) MG
1 TAB ORAL
Qty: 0 | Refills: 0 | DISCHARGE

## 2021-03-31 RX ORDER — FAMOTIDINE 10 MG/ML
0 INJECTION INTRAVENOUS
Qty: 0 | Refills: 0 | DISCHARGE

## 2021-03-31 NOTE — ASU DISCHARGE PLAN (ADULT/PEDIATRIC) - ASU DC SPECIAL INSTRUCTIONSFT
Instruction sheet given to patient.  F/U in 1 week in the office.  Liquid diet, non-citrus foods  If experiencing bleeding, call the office ASAP

## 2021-03-31 NOTE — ASU DISCHARGE PLAN (ADULT/PEDIATRIC) - CARE PROVIDER_API CALL
Jorge Tolentino)  Facial Plastic and Reconstructive Surgery; Otolaryngology  [address] [place] [address], [facility] [address]  [place] [address]  Phone: [phone]  Fax: [phone]  Follow Up Time: 1 week

## 2021-04-02 ENCOUNTER — NON-APPOINTMENT (OUTPATIENT)
Age: 26
End: 2021-04-02

## 2021-04-02 LAB — SURGICAL PATHOLOGY STUDY: SIGNIFICANT CHANGE UP

## 2021-04-08 ENCOUNTER — APPOINTMENT (OUTPATIENT)
Dept: OTOLARYNGOLOGY | Facility: CLINIC | Age: 26
End: 2021-04-08
Payer: COMMERCIAL

## 2021-04-08 VITALS
HEIGHT: 66 IN | TEMPERATURE: 98.2 F | DIASTOLIC BLOOD PRESSURE: 103 MMHG | SYSTOLIC BLOOD PRESSURE: 135 MMHG | HEART RATE: 103 BPM | BODY MASS INDEX: 35.03 KG/M2 | WEIGHT: 218 LBS

## 2021-04-08 PROBLEM — Z87.09 PERSONAL HISTORY OF OTHER DISEASES OF THE RESPIRATORY SYSTEM: Chronic | Status: ACTIVE | Noted: 2021-03-19

## 2021-04-08 PROBLEM — L51.1 STEVENS-JOHNSON SYNDROME: Chronic | Status: ACTIVE | Noted: 2021-03-19

## 2021-04-08 PROBLEM — E66.9 OBESITY, UNSPECIFIED: Chronic | Status: ACTIVE | Noted: 2021-03-19

## 2021-04-08 PROCEDURE — 99024 POSTOP FOLLOW-UP VISIT: CPT

## 2021-04-08 NOTE — HISTORY OF PRESENT ILLNESS
[de-identified] : s/p tonsillectomy 3/31/21\par Continues to have throat pain, however it is improving.  Drinking well.  No f/c/s

## 2021-04-08 NOTE — ASSESSMENT
[FreeTextEntry1] : HTN\par - F/U with PMD\par Patient 1 week status post tonsillectomy as predicted she had a miserable week she is healing very nicely she has exudative film on her tonsillar fossa's she was reassured that does not pus that is normal healing she will follow-up and see us in 1 week at which time we will determine if she will be able to go back to work as scheduled or if she will need more time.\par

## 2021-04-08 NOTE — PHYSICAL EXAM
[Normal] : normal appearance, well groomed, well nourished, and in no acute distress [Removed] : palatine tonsils previously removed [de-identified] : normal post-op changes, healing well.

## 2021-04-13 ENCOUNTER — APPOINTMENT (OUTPATIENT)
Dept: OTOLARYNGOLOGY | Facility: CLINIC | Age: 26
End: 2021-04-13
Payer: COMMERCIAL

## 2021-04-13 VITALS
BODY MASS INDEX: 35.03 KG/M2 | WEIGHT: 218 LBS | DIASTOLIC BLOOD PRESSURE: 82 MMHG | HEART RATE: 77 BPM | HEIGHT: 66 IN | SYSTOLIC BLOOD PRESSURE: 123 MMHG | TEMPERATURE: 98 F

## 2021-04-13 DIAGNOSIS — Z90.89 ACQUIRED ABSENCE OF OTHER ORGANS: ICD-10-CM

## 2021-04-13 PROCEDURE — 99024 POSTOP FOLLOW-UP VISIT: CPT

## 2021-04-13 NOTE — ASSESSMENT
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

## 2021-04-13 NOTE — PHYSICAL EXAM
[Normal] : mucosa is normal [Midline] : trachea located in midline position [de-identified] : normal post-op changes, healing well.

## 2021-04-13 NOTE — HISTORY OF PRESENT ILLNESS
[de-identified] : Patient presents s/p tonsillectomy 3/31/21. Feeling better, able to tolerate food. Denies bleeding, fever or SOB. \par no other modifying factors\par \par \par

## 2021-06-22 ENCOUNTER — EMERGENCY (EMERGENCY)
Facility: HOSPITAL | Age: 26
LOS: 1 days | Discharge: HOME | End: 2021-06-22
Attending: EMERGENCY MEDICINE | Admitting: EMERGENCY MEDICINE
Payer: OTHER MISCELLANEOUS

## 2021-06-22 VITALS
TEMPERATURE: 98 F | DIASTOLIC BLOOD PRESSURE: 82 MMHG | HEART RATE: 103 BPM | RESPIRATION RATE: 16 BRPM | OXYGEN SATURATION: 97 % | SYSTOLIC BLOOD PRESSURE: 132 MMHG | HEIGHT: 66 IN

## 2021-06-22 DIAGNOSIS — N75.0 CYST OF BARTHOLIN'S GLAND: Chronic | ICD-10-CM

## 2021-06-22 PROCEDURE — 99282 EMERGENCY DEPT VISIT SF MDM: CPT

## 2021-06-22 PROCEDURE — 99053 MED SERV 10PM-8AM 24 HR FAC: CPT

## 2021-06-22 RX ORDER — EMTRICITABINE AND TENOFOVIR DISOPROXIL FUMARATE 200; 300 MG/1; MG/1
1 TABLET, FILM COATED ORAL
Qty: 30 | Refills: 0
Start: 2021-06-22 | End: 2021-07-21

## 2021-06-22 RX ORDER — RALTEGRAVIR 400 MG/1
1 TABLET, FILM COATED ORAL
Qty: 60 | Refills: 0
Start: 2021-06-22 | End: 2021-07-21

## 2021-06-22 NOTE — ED PROVIDER NOTE - CLINICAL SUMMARY MEDICAL DECISION MAKING FREE TEXT BOX
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 Will obtain PEP labs, ppx if patient wants, TBDC.

## 2021-06-22 NOTE — ED PROVIDER NOTE - OBJECTIVE STATEMENT
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 Patient denies medical issues, surgeries. Denies eye pain, vision changes.

## 2021-06-22 NOTE — ED PROVIDER NOTE - ATTENDING CONTRIBUTION TO CARE
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 Patient denies medical issues, surgeries. Denies eye pain, vision changes. Washed out eyes thoroughly prior to arrival.   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 Here for exam and possible prophylaxis. benign exam and already washed her eyes out prior to arrival. Will obtain labs and discharge.

## 2021-06-22 NOTE — ED PROVIDER NOTE - PATIENT PORTAL LINK FT
You can access the FollowMyHealth Patient Portal offered by [facility] by registering at the following website: [email or web address]. By joining [employer]’s FollowMyHealth portal, you will also be able to view your health information using other applications (apps) compatible with our system.

## 2021-06-22 NOTE — ED ADULT TRIAGE NOTE - CHIEF COMPLAINT QUOTE
Rn from floor, pt was taking out an IV and the blood from the IV got into pt's eye. Denies vision changes.

## 2021-07-11 ENCOUNTER — TRANSCRIPTION ENCOUNTER (OUTPATIENT)
Age: 26
End: 2021-07-11

## 2021-08-30 ENCOUNTER — TRANSCRIPTION ENCOUNTER (OUTPATIENT)
Age: 26
End: 2021-08-30

## 2021-09-09 ENCOUNTER — OUTPATIENT (OUTPATIENT)
Dept: OUTPATIENT SERVICES | Facility: HOSPITAL | Age: 26
LOS: 1 days | End: 2021-09-09
Payer: COMMERCIAL

## 2021-09-09 ENCOUNTER — APPOINTMENT (OUTPATIENT)
Dept: ULTRASOUND IMAGING | Facility: CLINIC | Age: 26
End: 2021-09-09
Payer: COMMERCIAL

## 2021-09-09 DIAGNOSIS — N75.0 CYST OF BARTHOLIN'S GLAND: Chronic | ICD-10-CM

## 2021-09-09 DIAGNOSIS — R10.31 RIGHT LOWER QUADRANT PAIN: ICD-10-CM

## 2021-09-09 DIAGNOSIS — Z00.8 ENCOUNTER FOR OTHER GENERAL EXAMINATION: ICD-10-CM

## 2021-09-09 PROCEDURE — 76830 TRANSVAGINAL US NON-OB: CPT

## 2021-09-09 PROCEDURE — 76830 TRANSVAGINAL US NON-OB: CPT | Mod: 26

## 2021-11-26 ENCOUNTER — TRANSCRIPTION ENCOUNTER (OUTPATIENT)
Age: 26
End: 2021-11-26

## 2021-11-30 ENCOUNTER — APPOINTMENT (OUTPATIENT)
Dept: OTOLARYNGOLOGY | Facility: CLINIC | Age: 26
End: 2021-11-30
Payer: COMMERCIAL

## 2021-11-30 VITALS
HEIGHT: 66 IN | TEMPERATURE: 98 F | WEIGHT: 198 LBS | HEART RATE: 85 BPM | DIASTOLIC BLOOD PRESSURE: 96 MMHG | SYSTOLIC BLOOD PRESSURE: 137 MMHG | BODY MASS INDEX: 31.82 KG/M2

## 2021-11-30 DIAGNOSIS — J03.91 ACUTE RECURRENT TONSILLITIS, UNSPECIFIED: ICD-10-CM

## 2021-11-30 DIAGNOSIS — R07.0 PAIN IN THROAT: ICD-10-CM

## 2021-11-30 DIAGNOSIS — J02.9 ACUTE PHARYNGITIS, UNSPECIFIED: ICD-10-CM

## 2021-11-30 PROCEDURE — 99214 OFFICE O/P EST MOD 30 MIN: CPT | Mod: 25

## 2021-11-30 PROCEDURE — 31575 DIAGNOSTIC LARYNGOSCOPY: CPT

## 2021-11-30 NOTE — ASSESSMENT
[FreeTextEntry1] : Patient follows up she is been doing great after having her tonsils removed until recently she got an upper respiratory tract infection obviously Covid negative she is working on the pediatric floors at [place] most likely got a virus nasal endoscopy shows a lot of thick secretions in the nasopharynx fiberoptic evaluation shows redness consistent with a viral laryngitis she was reassured no further acute interventions indicated put her on Flonase nasal spray fully expect her symptoms to resolve being self-limiting.

## 2021-11-30 NOTE — REVIEW OF SYSTEMS
[Throat Pain] : throat pain [Negative] : Heme/Lymph Clofazimine Counseling:  I discussed with the patient the risks of clofazimine including but not limited to skin and eye pigmentation, liver damage, nausea/vomiting, gastrointestinal bleeding and allergy.

## 2021-11-30 NOTE — END OF VISIT
[FreeTextEntry3] : I saw and examined this patient in person. I have discussed with Celena Cervantes, Physician Assistant, in detail the above note and agree with the above assessment and plan of care.\par

## 2021-11-30 NOTE — HISTORY OF PRESENT ILLNESS
[de-identified] : PAtient has been  having a sore throat for a week and was tested for COVID several times. SHe does not have any issues with eating drinking or swallowing but she does have moderate discomfort with swallowing. At this point her tonsillectomy was over 6 months ago. SHe does not have any voice changes. SHe has been fine since surgery until now

## 2021-12-20 ENCOUNTER — APPOINTMENT (OUTPATIENT)
Dept: GASTROENTEROLOGY | Facility: CLINIC | Age: 26
End: 2021-12-20

## 2022-02-02 ENCOUNTER — TRANSCRIPTION ENCOUNTER (OUTPATIENT)
Age: 27
End: 2022-02-02

## 2022-06-16 ENCOUNTER — NON-APPOINTMENT (OUTPATIENT)
Age: 27
End: 2022-06-16

## 2022-08-29 ENCOUNTER — NON-APPOINTMENT (OUTPATIENT)
Age: 27
End: 2022-08-29

## 2022-09-01 NOTE — ED ADULT TRIAGE NOTE - ESI TRIAGE ACUITY LEVEL, MLM
ED General Adult HPI





- General


Chief complaint: Syncope


Stated complaint: HIP PAIN


PUI?: No


Time Seen by Provider: 09/01/22 13:15


Source: patient


Mode of arrival: Ambulatory


Limitations: No Limitations





- History of Present Illness


Initial comments: 





72 YO HAS NOW BEEN IN ER 24 HOURS - IN WAITING ROOM. 





SHE HAS A/C BACK PAIN


SHE HAD EPIDURAL SCHEDULED BUT KATHYA CANCELLED IT


SHE IS ON PERCOCET AT HOME FOR THE PAIN 





SHE INITALLY REFUSED TO COME TO FAST TRACK SHE STATES SHE HAS BEEN HERE 2 OTHER 

TIMES AND SHE WANTED TO GO TO MAIN


PER DR RAI SHE NEEDED TO GO TO FT OR GO AMA; PT AGREED WITH FT





NO CP


NO SOB


NO FEVER/CHILLS


SHE IS IN HER USUAL STATE OF HEALTH AND IS FRUSTRATED WITH HER BACLK AND LEG 

PAIN


AMBULATORY AND NEURO INTACT


-: Gradual, year(s)


Severity scale (0 -10): 10


Associated Symptoms: denies other symptoms





- Related Data


                                  Previous Rx's











 Medication  Instructions  Recorded  Last Taken  Type


 


DULoxetine [Cymbalta] 60 mg PO QDAY 15 Days #30 capsule 10/26/19 Unknown Rx


 


predniSONE [Deltasone] 20 mg PO DAILY #5 tablet 09/01/22 Unknown Rx











                                    Allergies











Allergy/AdvReac Type Severity Reaction Status Date / Time


 


latex Allergy  Rash Verified 08/31/22 14:52


 


tape Allergy  Rash Uncoded 08/31/22 14:52














ED Review of Systems


ROS: 


Stated complaint: HIP PAIN


Other details as noted in HPI





Comment: All other systems reviewed and negative





ED Past Medical Hx





- Past Medical History


Previous Medical History?: Yes


Hx Diabetes: Yes


Hx Arthritis: Yes (Rheumatoid)


Hx Asthma: Yes


Hx COPD: Yes (Pt denies)


Additional medical history: Fibromyalgia





- Surgical History


Past Surgical History?: Yes


Additional Surgical History: Stomach cancer surgery, Hysterectomy @ age 29,.  

Tubaligation, sinus surgery





- Family History


Family history: no significant





- Social History


Smoking Status: Never Smoker


Substance Use Type: None





- Medications


Home Medications: 


                                Home Medications











 Medication  Instructions  Recorded  Confirmed  Last Taken  Type


 


DULoxetine [Cymbalta] 60 mg PO QDAY 15 Days #30 capsule 10/26/19  Unknown Rx


 


predniSONE [Deltasone] 20 mg PO DAILY #5 tablet 09/01/22  Unknown Rx














ED Physical Exam





- General


Limitations: No Limitations


General appearance: alert, in no apparent distress





- Head


Head exam: Present: atraumatic, normocephalic





- Eye


Eye exam: Present: normal appearance





- ENT


ENT exam: Present: mucous membranes moist





- Neck


Neck exam: Present: normal inspection





- Respiratory


Respiratory exam: Present: normal lung sounds bilaterally.  Absent: respiratory 

distress





- Cardiovascular


Cardiovascular Exam: Present: regular rate, normal rhythm.  Absent: systolic 

murmur, diastolic murmur, rubs, gallop





- GI/Abdominal


GI/Abdominal exam: Present: soft, normal bowel sounds





- Extremities Exam


Extremities exam: Present: normal inspection





- Back Exam


Back exam: Present: normal inspection





- Neurological Exam


Neurological exam: Present: alert, oriented X3





- Psychiatric


Psychiatric exam: Present: normal affect, normal mood





- Skin


Skin exam: Present: warm, dry, intact, normal color.  Absent: rash





ED Course


                                   Vital Signs











  08/31/22 09/01/22





  14:48 04:35


 


Temperature 98.5 F 97.9 F


 


Pulse Rate 83 80


 


Respiratory 18 16





Rate  


 


Blood Pressure  175/87


 


Blood Pressure 184/95 





[Left]  


 


O2 Sat by Pulse 99 97





Oximetry  














ED Medical Decision Making





- Lab Data


Result diagrams: 


                                 08/31/22 16:10





                                 08/31/22 16:10





- EKG Data


-: EKG Interpreted by Me


EKG shows normal: sinus rhythm


Rate: normal





- EKG Data


When compared to previous EKG there are: no significant change


Interpretation: no acute changes





- Radiology Data


Radiology results: report reviewed, image reviewed





- Medical Decision Making








                                   Lab Results











  08/31/22 08/31/22 Range/Units





  16:10 16:10 


 


WBC  6.4   (4.5-11.0)  K/mm3


 


RBC  3.65   (3.65-5.03)  M/mm3


 


Hgb  10.6   (10.1-14.3)  gm/dl


 


Hct  31.8   (30.3-42.9)  %


 


MCV  87   (79-97)  fl


 


MCH  29   (28-32)  pg


 


MCHC  34   (30-34)  %


 


RDW  15.0   (13.2-15.2)  %


 


Plt Count  437   (140-440)  K/mm3


 


Lymph % (Auto)  28.9   (13.4-35.0)  %


 


Mono % (Auto)  4.9   (0.0-7.3)  %


 


Eos % (Auto)  1.2   (0.0-4.3)  %


 


Baso % (Auto)  0.4   (0.0-1.8)  %


 


Lymph # (Auto)  1.8   (1.2-5.4)  K/mm3


 


Mono # (Auto)  0.3   (0.0-0.8)  K/mm3


 


Eos # (Auto)  0.1   (0.0-0.4)  K/mm3


 


Baso # (Auto)  0.0   (0.0-0.1)  K/mm3


 


Seg Neutrophils %  64.6   (40.0-70.0)  %


 


Seg Neutrophils #  4.1   (1.8-7.7)  K/mm3


 


Sodium   140  (137-145)  mmol/L


 


Potassium   4.4  (3.6-5.0)  mmol/L


 


Chloride   102.3  ()  mmol/L


 


Carbon Dioxide   27  (22-30)  mmol/L


 


Anion Gap   15  mmol/L


 


BUN   15  (7-17)  mg/dL


 


Creatinine   0.7  (0.6-1.2)  mg/dL


 


Estimated GFR   > 60  ml/min


 


BUN/Creatinine Ratio   21  %


 


Glucose   191 H  ()  mg/dL


 


Calcium   9.3  (8.4-10.2)  mg/dL


 


Total Bilirubin   0.20  (0.1-1.2)  mg/dL


 


AST   10  (5-40)  units/L


 


ALT   14  (7-56)  units/L


 


Alkaline Phosphatase   111  ()  units/L


 


Total Creatine Kinase   62  ()  units/L


 


CK-MB (CK-2)   1.4  (0.0-4.0)  ng/mL


 


CK-MB (CK-2) Rel Index   2.2  (0-4)  


 


Troponin T   < 0.010  (0.00-0.029)  ng/mL


 


NT-Pro-B Natriuret Pep   9.22  (0-900)  pg/mL


 


Total Protein   7.1  (6.3-8.2)  g/dL


 


Albumin   4.1  (3.9-5)  g/dL


 


Albumin/Globulin Ratio   1.4  %











                                   Vital Signs











  08/31/22 09/01/22





  14:48 04:35


 


Temperature 98.5 F 97.9 F


 


Pulse Rate 83 80


 


Respiratory 18 16





Rate  


 


Blood Pressure  175/87


 


Blood Pressure 184/95 





[Left]  


 


O2 Sat by Pulse 99 97





Oximetry  








MEDICATED WITH [facility]





PT EDUCATED ON HER CHRONIC PAIN MANAGEMENT 





DC HOME WITH DC PLAN OF CARE INCLUDING ASKING [employer] TO MOVE HER EPIDURAL UP. 





PT NEURO INTACT


AMBULATORY 


NAD


TAKING PO





DC HOME WITH DC PLAN OF CARE INCLUDING DIET, MEDS, ACTIVITY AND FOLLOW UP





- Differential Diagnosis


A/C PAIN


Critical care attestation.: 


If time is entered above; I have spent that time in minutes in the direct care 

of this critically ill patient, excluding procedure time.








ED Disposition


Clinical Impression: 


 Chronic pain, Fibromyalgia, Drug-seeking behavior





Disposition: 01 HOME / SELF CARE / HOMELESS


Is pt being admited?: No


Does the pt Need Aspirin: No


Condition: Stable


Instructions:  Opioid Pain Medicine Management


Additional Instructions: 


follow up with KATHYA AS WE DISCUSSED





MEDS AS ORDERED TODAY








Prescriptions: 


predniSONE [Deltasone] 20 mg PO DAILY #5 tablet


Referrals: 


JO JONES MD [Staff Physician] - 3-5 Days


Time of Disposition: 13:17 2

## 2022-10-03 ENCOUNTER — APPOINTMENT (OUTPATIENT)
Dept: UROLOGY | Facility: CLINIC | Age: 27
End: 2022-10-03

## 2022-10-03 DIAGNOSIS — N39.41 URGE INCONTINENCE: ICD-10-CM

## 2022-10-03 DIAGNOSIS — R39.15 URGENCY OF URINATION: ICD-10-CM

## 2022-10-03 PROCEDURE — 99204 OFFICE O/P NEW MOD 45 MIN: CPT

## 2022-10-03 NOTE — HISTORY OF PRESENT ILLNESS
[FreeTextEntry1] : patient with 2 hyears hx of sp pressure and occas  urge INC en rout to BR\par works as RN - hold urine \par sexyually activ without issues\par normal bm\par no StDs\par no pregen\par + obese and stable weight\par avg water inatke \par recent pevlc sono for above : sept 2021 : normal \par recent ua ( aug 2022 ) : normal \par here for further eval :

## 2022-10-03 NOTE — PHYSICAL EXAM
[General Appearance - Well Developed] : well developed [General Appearance - Well Nourished] : well nourished [Normal Appearance] : normal appearance [Well Groomed] : well groomed [General Appearance - In No Acute Distress] : no acute distress [Edema] : no peripheral edema [Respiration, Rhythm And Depth] : normal respiratory rhythm and effort [Exaggerated Use Of Accessory Muscles For Inspiration] : no accessory muscle use [Abdomen Soft] : soft [Abdomen Tenderness] : non-tender [Abdomen Mass (___ Cm)] : no abdominal mass palpated [Abdomen Hernia] : no hernia was discovered [Costovertebral Angle Tenderness] : no ~M costovertebral angle tenderness [FreeTextEntry1] : pvr- 23 ml  [Normal Station and Gait] : the gait and station were normal for the patient's age [] : no rash [No Focal Deficits] : no focal deficits [Oriented To Time, Place, And Person] : oriented to person, place, and time [Affect] : the affect was normal [Mood] : the mood was normal [Not Anxious] : not anxious [No Palpable Adenopathy] : no palpable adenopathy

## 2022-10-03 NOTE — REVIEW OF SYSTEMS
[Urine Infection (bladder/kidney)] : bladder/kidney infection [Strong urge to urinate] : strong urge to urinate [Bladder pressure] : experiences bladder pressure [Strain or push to urinate] : strain or push to urinate [Negative] : Endocrine [see HPI] : see HPI [Anxiety] : anxiety [Swollen Glands] : swollen glands

## 2022-10-03 NOTE — ASSESSMENT
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

## 2022-10-05 ENCOUNTER — APPOINTMENT (OUTPATIENT)
Dept: PSYCHIATRY | Facility: CLINIC | Age: 27
End: 2022-10-05

## 2022-10-05 PROCEDURE — 99205 OFFICE O/P NEW HI 60 MIN: CPT

## 2022-10-05 NOTE — EDUCATION
[Medication education provided] : Medication education provided [Rationale for medication choices, possible risks/precautions, benefits, alternative treatment choices, and consequences of] : Rationale for medication choices, possible risks/precautions, benefits, alternative treatment choices, and consequences of non-treatment discussed with patient/family/caregiver

## 2022-10-05 NOTE — SOCIAL HISTORY
[FreeTextEntry1] : raised in [place], live with bio parents and younger brother; attended [employer] through grade school  did very well; attended nursing school in [place], "good student"; reports good relationship with family and supportive friends; Has been in 2 previous romantic relationships, none current; \par Substance use: alcohol socially; reports a 2 days of excessive drinking hx bottle of wine a day in context of a romantic breakup without impairment or consequences ; denies any regular drinking currently

## 2022-10-05 NOTE — DISCUSSION/SUMMARY
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

## 2022-10-05 NOTE — PHYSICAL EXAM
[Average] : average [Cooperative] : cooperative [Depressed] : depressed [Anxious] : anxious [Clear] : clear [Linear/Goal Directed] : linear/goal directed [None] : none [Preoccupations/Ruminations] : preoccupations/ruminations [Obsessional] : obsessional [Depressive] : depressive [None Reported] : none reported [Above average] : above average [WNL] : within normal limits [de-identified] : anxious

## 2022-10-05 NOTE — REASON FOR VISIT
[Self-Referred] : Self-Referred [Patient] : Patient [FreeTextEntry2] : ocd, depression, anxiety  [FreeTextEntry1] : ocd, depression, anxiety

## 2022-10-05 NOTE — RISK ASSESSMENT
[Clinical Records] : Clinical Records [Clinical Interview] : Clinical Interview [No] : No [Identifies reasons for living] : identifies reasons for living [Supportive social network of family or friends] : supportive social network of family or friends [Cultural, spiritual and/or moral attitudes against suicide] : cultural, spiritual and/or moral attitudes against suicide [Ability to cope with stress] : ability to cope with stress [Positive therapeutic relationships] : positive therapeutic relationships [Responsibility to children, family, or others] : responsibility to children, family, or others [Frustration tolerance] : frustration tolerance [Fear of death/actual act of killing self] : fear of death or the actual act of killing self [Engaged in work or school] : engaged in work or school

## 2022-10-05 NOTE — HISTORY OF PRESENT ILLNESS
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  At that time parent sought help from [facility] counselor for help with separation anxiety coping which pt found helpful; Reports in middle and high school improvement, however continued to recall "stressful time" in school regarding academic high achievement and test taking anxiety; Pt did very well in school without any additional educational supports, then attended nursing school in [place] where again did very well however recalls this again as a very stressful time.  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  Additional this summer broke up with boyfriend of 8 months, and work related stressors; Adds she has supports at home and work however found herself crying easily "worrying about everything all the time" and describes panic attacks becoming more frequent, last occurred 2 days ago.  She noted over last few months started to isolate herself, loss of interests in hangout out with friends or family and close coworkers noted her increase in excessive handwashing behaviors.  Pt sought online therapy help with hopes of starting CBT format soon, currently only supportive talk therapy, pt is interested in [facility] erp referral; \par Pt denies hx of psychosis/ladonna/abuse/ED/ADHD/substance abuse [FreeTextEntry2] : supportive counseling in grade school from school counselor (elementary and middle school)\par  [FreeTextEntry3] : none

## 2022-10-05 NOTE — PLAN
[FreeTextEntry4] : -psychoeducation about diagnosis and treatment modalities, alternatives to recommended treatment, risk Vs benefits of treatment and no treatment and alternative treatments.\par - resources: iocdf.org\par - HIPPA for therapist, recommend ERP\par -Lab/other tests: appreciate coordination of care with PMD, TSH screen\par -Medication: Start Sertraline 50mg. \par -Safety:Educated patient of importance of remaining abstinent from drugs and alcohol; Emergency procedures were discussed\par -Patient given opportunity to ask questions and their questions were answered and they expressed understanding and agreement with above plan.\par -Follow up: 2 weeks for medication management or earlier as needed\par

## 2022-10-19 ENCOUNTER — APPOINTMENT (OUTPATIENT)
Dept: PSYCHIATRY | Facility: CLINIC | Age: 27
End: 2022-10-19

## 2022-10-26 ENCOUNTER — APPOINTMENT (OUTPATIENT)
Dept: PSYCHIATRY | Facility: CLINIC | Age: 27
End: 2022-10-26

## 2022-10-26 PROCEDURE — 90791 PSYCH DIAGNOSTIC EVALUATION: CPT | Mod: 95

## 2022-10-26 NOTE — HISTORY OF PRESENT ILLNESS
[FreeTextEntry1] : Pt presented to session reporting sxs of OCD.\par \par Obsessions include contamination concerns of transmitting or rose diseases, giving an injection incorrectly, fear that something bad will happen to self or loves ones and discomfort that something does not feel right. Blood is the largest concern for her at the moment. Compulsions include cleaning, hand washing, wiping surfaces, avoiding touching items, seeking reassurance from coworkers, wearing gloves, wears extra PPE, segregating work clothes, mentally reviewing scenarios, cleaning bathroom ritual and doing certain behaviors a certain number of times. \par \par Pt reported her OCD sxs cause her a great deal of anxiety and that she feels limited in terms of what she can do because of her OCD and experiences feelings of hopelessness due to OCD. \par \par Pt reported experiencing OCD sxs  when she was in elementary school. Obsessions were related to harm. 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 Pt reported experiencing anxiety sxs since she was a child. Reported experiencing separation anxiety when younger, social anxiety when in high school, and anxiety when in college due to high school work demand.

## 2022-10-26 NOTE — DISCUSSION/SUMMARY
[FreeTextEntry1] : ROSANA PACHECO is a 27 year White female.\par \par Psychologist explained process of evaluation (interview, questionnaires) and that evaluation did not guarantee treatment, if Pt's diagnoses were outside of Psychologist's expertise. Pt expressed understanding. Psychologist gave an overview of typical course of Cognitive Behavioral Therapy, explained the basic CBT model and collaborative means of therapy.\par \par Psychologist explained limits of confidentiality (plans to harm/kill self/others). Pt asked questions as needed. Psychologist reviewed terms of confidentiality, consent, practice rules and expectations. Pt was in agreement. Built rapport with pt.\par \par Psychologist met with Pt using teletherapy platform due to COVID-19 pandemic disaster. Pt was at their home, while psychologist was working remotely. Pt continued to provide consent for telehealth services.\par \par Pt presented to session reporting sxs of OCD.\par \par Obsessions include contamination concerns of transmitting or rose diseases, giving an injection incorrectly, fear that something bad will happen to self or loves ones and discomfort that something does not feel right. Blood is the largest concern for her at the moment. Compulsions include cleaning, hand washing, wiping surfaces, avoiding touching items, seeking reassurance from coworkers, wearing gloves, wears extra PPE, segregating work clothes, mentally reviewing scenarios, cleaning bathroom ritual and doing certain behaviors a certain number of times. \par \par Pt reported her OCD sxs cause her a great deal of anxiety and that she feels limited in terms of what she can do because of her OCD and experiences feelings of hopelessness due to OCD. \par \par Pt reported experiencing OCD sxs  when she was in elementary school. Obsessions were related to harm. 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 Pt reported experiencing anxiety sxs since she was a child. Reported experiencing separation anxiety when younger, social anxiety when in high school, and anxiety when in college due to high school work demand.

## 2022-11-01 ENCOUNTER — APPOINTMENT (OUTPATIENT)
Dept: PSYCHIATRY | Facility: CLINIC | Age: 27
End: 2022-11-01

## 2022-11-01 PROCEDURE — 99214 OFFICE O/P EST MOD 30 MIN: CPT

## 2022-11-01 NOTE — SOCIAL HISTORY
[FreeTextEntry1] : raised in [place], live with bio parents and younger brother; attended [employer] through grade school did very well; attended nursing school in [place], "good student"; reports good relationship with family and supportive friends; Has been in 2 previous romantic relationships, none current; \par Substance use: alcohol socially; reports a 2 days of excessive drinking hx bottle of wine a day in context of a romantic breakup without impairment or consequences ; denies any regular drinking currently \par  \par

## 2022-11-01 NOTE — PHYSICAL EXAM
[Average] : average [Cooperative] : cooperative [Depressed] : depressed [Anxious] : anxious [Clear] : clear [Linear/Goal Directed] : linear/goal directed [None] : none [Preoccupations/Ruminations] : preoccupations/ruminations [Obsessional] : obsessional [Depressive] : depressive [None Reported] : none reported [Above average] : above average [WNL] : within normal limits [de-identified] : anxious

## 2022-11-01 NOTE — DISCUSSION/SUMMARY
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

## 2022-11-01 NOTE — HISTORY OF PRESENT ILLNESS
[FreeTextEntry1] : pt present for feedback session today; reports has had an intake with behavior therapy clinic; not interested in med mgmt at this time; considering taking short medical leave from work for mental health and attend therapy regularly; No interval safety concerns; Intake hx reviewed:\par Patient is a 28 yo female, domiciled with bio parents and younger brother, currently working as pediatric nurse at [employer], currently in outpatient treatment with online therapist for anxiety and depression, no prior psychiatric hospitalization, no self-injury or suicide attempts, no aggression/violence, no legal issues, no CPS involvement,[facility] sig for anxiety, presenting today for diagnostic evaluation and medication initiation.\par \par Pt explains she's always been an anxious person, recalls significant separation anxiety in elementary school through 4th grade. At that time parent sought help from [facility] counselor for help with separation anxiety coping which pt found helpful; Reports in middle and high school improvement, however continued to recall "stressful time" in school regarding academic high achievement and test taking anxiety; Pt did very well in school without any additional educational supports, then attended nursing school in [place] where again did very well however recalls this again as a very stressful time. 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 Additional this summer broke up with boyfriend of 8 months, and work related stressors; Adds she has supports at home and work however found herself crying easily "worrying about everything all the time" and describes panic attacks becoming more frequent, last occurred 2 days ago. She noted over last few months started to isolate herself, loss of interests in hangout out with friends or family and close coworkers noted her increase in excessive handwashing behaviors. Pt sought online therapy help with hopes of starting CBT format soon, currently only supportive talk therapy, pt is interested in [facility] erp referral; \par Pt denies hx of psychosis/ladonna/abuse/ED/ADHD/substance abuse. \par  [FreeTextEntry2] : none

## 2022-11-01 NOTE — PLAN
[No] : No [Medication education provided] : Medication education provided. [Rationale for medication choices, possible risks/precautions, benefits, alternative treatment choices, and consequences of non-treatment discussed] : Rationale for medication choices, possible risks/precautions, benefits, alternative treatment choices, and consequences of non-treatment discussed with patient/family/caregiver  [FreeTextEntry5] : -psychoeducation about diagnosis and treatment modalities, alternatives to recommended treatment, risk Vs benefits of treatment and no treatment and alternative treatments.\par - resources: iocdf.org\par - HIPPA for therapist, recommend ERP\par -Lab/other tests: appreciate coordination of care with PMD, TSH screen\par -Medication: recomment start SSRI,  Sertraline 50mg, pt not interested in medication mgmt at this time.\par -Safety:Educated patient of importance of remaining abstinent from drugs and alcohol; Emergency procedures were discussed\par -Patient given opportunity to ask questions and their questions were answered and they expressed understanding and agreement with above plan.\par -Follow up: as needed if form fill for work medical leave or if interested for med mgt follow up \par

## 2022-11-02 ENCOUNTER — APPOINTMENT (OUTPATIENT)
Dept: PSYCHIATRY | Facility: CLINIC | Age: 27
End: 2022-11-02

## 2022-11-02 ENCOUNTER — APPOINTMENT (OUTPATIENT)
Dept: GASTROENTEROLOGY | Facility: CLINIC | Age: 27
End: 2022-11-02

## 2022-11-02 VITALS
HEART RATE: 86 BPM | WEIGHT: 216 LBS | BODY MASS INDEX: 34.72 KG/M2 | SYSTOLIC BLOOD PRESSURE: 125 MMHG | OXYGEN SATURATION: 98 % | HEIGHT: 66 IN | DIASTOLIC BLOOD PRESSURE: 80 MMHG

## 2022-11-02 DIAGNOSIS — K58.1 IRRITABLE BOWEL SYNDROME WITH CONSTIPATION: ICD-10-CM

## 2022-11-02 PROCEDURE — 90837 PSYTX W PT 60 MINUTES: CPT | Mod: 95

## 2022-11-02 PROCEDURE — 99203 OFFICE O/P NEW LOW 30 MIN: CPT

## 2022-11-02 RX ORDER — LINACLOTIDE 145 UG/1
145 CAPSULE, GELATIN COATED ORAL
Qty: 30 | Refills: 5 | Status: ACTIVE | COMMUNITY
Start: 2022-11-02 | End: 1900-01-01

## 2022-11-02 NOTE — PHYSICAL EXAM

## 2022-11-02 NOTE — ASSESSMENT
[FreeTextEntry1] : This is a pleasant 27-year-old female with chronic lower abdominal pressure-like pain and chronic constipation.  GYN and urology evaluation have been unremarkable.  She denies weight loss, anemia, change in bowel habits.  She denies family history of colon cancer or inflammatory bowel disease.  There is a strong possibility that her GI symptoms are due to constipation predominant irritable bowel syndrome.  I explained this to her at length.  I recommend increasing fiber intake water intake and exercise.  I recommend a trial of Linzess starting at 145 mcg to be taken half hour to an hour before breakfast.  I informed her possible side effect of Linzess including but not limited to diarrhea.  She is to give me a call if she develops diarrhea within 2 weeks of Linzess at which time I will lower the dose.  She is to call me if no improvement in bowel movements after 1 to 2 weeks of Linzess at which time I will increase the dose.  At first she wants to give a trial of MiraLAX nightly.  Ports a normal transvaginal ultrasound performed by her GYN.  I offered CT of the pelvis.  She prefers to hold off.  She is to call me if she has any questions or concerns.

## 2022-11-02 NOTE — HISTORY OF PRESENT ILLNESS
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  She relates that this has been ongoing for approximately 1 year not related to bowel movements.  She denies nausea vomiting.  She denies rectal bleeding or melena.  She denies weight loss or anemia.  She reports chronic constipation and that she has bowel movements every 2 to 3 days, sometimes with straining.  She has seen urology and GYN with negative work-up including a negative transvaginal ultrasound.  She has seen her internist who told her that this could possibly be due to constipation.  She has tried fiber supplements but is not consistent.  She denies family history of colon cancer or inflammatory bowel disease.

## 2022-11-02 NOTE — PLAN
[FreeTextEntry2] : CBT for OCD. [Cognitive and/or Behavior Therapy] : Cognitive and/or Behavior Therapy  [de-identified] : Pt was Ox3. Pt's affect was observed to be euthymic. No risk was observed or reported.\par \par Psychologist met with Pt using teletherapy platform due to COVID-19 pandemic disaster. Pt was at their home, while psychologist was working remotely. Pt continued to provide consent for telehealth services.\par \par in session, reviewed sxs discussed last appt. Completed YBOCS assessment for OCD sxs. Pt reported contamination obsessions, one obsession related to morality, one obsession related to exactness, somatic obsessions related to disease and body image. Pt endorsed compulsions related to cleaning/washing, checking, repeating rituals a number of times that is not an  'unlucky number' for her, rearranging, mental compulsions and miscellaneous compulsions including hair pulling and doing certain behaviors to prevent harm coming to others. YBOCS Total Score=31; Severe. Discussed pt's interest in parents being involved in tx and therapist not recommending pt taking a leave from work. Next session will introduce tx plan. [FreeTextEntry1] : Treatment plan reviewed for psycho-education include the following:\par 1. Pt will learn about their symptoms and diagnosis and be able to explain it back to psychologist.\par 2. Pt will learn about the CBT model and be able to accurately categorize their symptoms and diagnosis in terms of cognition, emotional responses, behaviors, and physiological arousal.\par Treatment planning reviewed for exposure and response prevention included the following:\par 1. Pt can benefit from learning/continuing exposure therapy interventions, involving confronting situational, emotional, physiological, and cognitive avoided situations. \par 2. Pt can benefit from reducing and eliminating safety behaviors/objects, such as maladaptive response/ritualization prevention, removing "just in case" items that maintain anxiety, panic, obsessive, and post-traumatic symptoms. \par 3. Exposure therapy involves any or all of these modalities: in vivo, imaginal, interoceptive, Virtual Reality, written/narrative, and analog in vivo exposure\par 4. Exposures will be conducted within psychotherapy sessions and assigned for homework between sessions\par

## 2022-11-02 NOTE — REASON FOR VISIT
[Home] : at home, [unfilled] , at the time of the visit. [Other Location: e.g. Home (Enter Location, City,State)___] : at [unfilled] [Patient] : Patient

## 2022-11-09 ENCOUNTER — APPOINTMENT (OUTPATIENT)
Dept: PSYCHIATRY | Facility: CLINIC | Age: 27
End: 2022-11-09

## 2022-11-09 PROCEDURE — 90837 PSYTX W PT 60 MINUTES: CPT | Mod: 95

## 2022-11-09 NOTE — PLAN
[FreeTextEntry2] : CBT for OCD. [Cognitive and/or Behavior Therapy] : Cognitive and/or Behavior Therapy  [de-identified] : Pt was Ox3. Pt's affect was observed to be euthymic. No risk was observed or reported.\par \par Psychologist met with Pt using teletherapy platform due to COVID-19 pandemic disaster. Pt was at their home, while psychologist was working remotely. Pt continued to provide consent for telehealth services.\par \par in session, reviewed sxs discussed from YBOCS. Introduced tx plan for OCD.\par Treatment plan reviewed for psycho-education include the following:\par 1. Pt will learn about their symptoms and diagnosis and be able to explain it back to psychologist.\par 2. Pt will learn about the CBT model and be able to accurately categorize their symptoms and diagnosis in terms of cognition, emotional responses, behaviors, and physiological arousal.\par Treatment planning reviewed for exposure and response prevention included the following:\par 1. Pt can benefit from learning/continuing exposure therapy interventions, involving confronting situational, emotional, physiological, and cognitive avoided situations. \par 2. Pt can benefit from reducing and eliminating safety behaviors/objects, such as maladaptive response/ritualization prevention, removing "just in case" items that maintain anxiety, panic, obsessive, and post-traumatic symptoms. \par 3. Exposure therapy involves any or all of these modalities: in vivo, imaginal, interoceptive, Virtual Reality, written/narrative, and analog in vivo exposure\par 4. Exposures will be conducted within psychotherapy sessions and assigned for homework between sessions\par pt expressed understanding and is in agreement with tx plan. Provided psychoeducation about OCD. Reviewed the difference between obsessions and compulsions, cycle of OCD and introduced ERP. Pt asked questions throughout and expressed understanding of how OCD maintains itself and ERP assists in breaking the cycle. Began creating fear hierarchy. HW assigned for pt to add exposure to the fear hierarchy. Next session will continue creating fear hierarchy. [FreeTextEntry1] : Treatment plan reviewed for psycho-education include the following:\par 1. Pt will learn about their symptoms and diagnosis and be able to explain it back to psychologist.\par 2. Pt will learn about the CBT model and be able to accurately categorize their symptoms and diagnosis in terms of cognition, emotional responses, behaviors, and physiological arousal.\par Treatment planning reviewed for exposure and response prevention included the following:\par 1. Pt can benefit from learning/continuing exposure therapy interventions, involving confronting situational, emotional, physiological, and cognitive avoided situations. \par 2. Pt can benefit from reducing and eliminating safety behaviors/objects, such as maladaptive response/ritualization prevention, removing "just in case" items that maintain anxiety, panic, obsessive, and post-traumatic symptoms. \par 3. Exposure therapy involves any or all of these modalities: in vivo, imaginal, interoceptive, Virtual Reality, written/narrative, and analog in vivo exposure\par 4. Exposures will be conducted within psychotherapy sessions and assigned for homework between sessions\par

## 2022-11-09 NOTE — REASON FOR VISIT
[Patient] : Patient [Home] : at home, [unfilled] , at the time of the visit. [Other Location: e.g. Home (Enter Location, City,State)___] : at [unfilled]

## 2022-11-15 ENCOUNTER — APPOINTMENT (OUTPATIENT)
Dept: PSYCHIATRY | Facility: CLINIC | Age: 27
End: 2022-11-15

## 2022-11-15 PROCEDURE — 99214 OFFICE O/P EST MOD 30 MIN: CPT | Mod: 95

## 2022-11-15 PROCEDURE — 90837 PSYTX W PT 60 MINUTES: CPT | Mod: 95

## 2022-11-15 NOTE — REASON FOR VISIT
[Home] : at home, [unfilled] , at the time of the visit. [Medical Office: ([facility])___] : at the medical office located in  [Self] : self [FreeTextEntry4] : Patient seen virtually, patient or guardian consents to conduct this via two-way video HIPPA compliant [employer].  [Patient] : Patient [FreeTextEntry1] : OCD, anxiety

## 2022-11-15 NOTE — PLAN
[FreeTextEntry2] : CBT for OCD. [Cognitive and/or Behavior Therapy] : Cognitive and/or Behavior Therapy  [de-identified] : Pt was Ox3. Pt's affect was observed to be euthymic. No risk was observed or reported.\par \par Psychologist met with Pt using teletherapy platform due to COVID-19 pandemic disaster. Pt was at their home, while psychologist was working remotely. Pt continued to provide consent for telehealth services.\par \par Pt completed HW of adding to fear hierarchy. In session, continued creating fear hierarchy. Completed exposure of touching pointy iteams around her house while resisting checking if she has bled until the end of the exposure. Pt rated SUDS as 5/10. HW assigned for pt to add exposure to the fear hierarchy. Next session will continue with exposures. [FreeTextEntry1] : Treatment plan reviewed for psycho-education include the following:\par 1. Pt will learn about their symptoms and diagnosis and be able to explain it back to psychologist.\par 2. Pt will learn about the CBT model and be able to accurately categorize their symptoms and diagnosis in terms of cognition, emotional responses, behaviors, and physiological arousal.\par Treatment planning reviewed for exposure and response prevention included the following:\par 1. Pt can benefit from learning/continuing exposure therapy interventions, involving confronting situational, emotional, physiological, and cognitive avoided situations. \par 2. Pt can benefit from reducing and eliminating safety behaviors/objects, such as maladaptive response/ritualization prevention, removing "just in case" items that maintain anxiety, panic, obsessive, and post-traumatic symptoms. \par 3. Exposure therapy involves any or all of these modalities: in vivo, imaginal, interoceptive, Virtual Reality, written/narrative, and analog in vivo exposure\par 4. Exposures will be conducted within psychotherapy sessions and assigned for homework between sessions\par

## 2022-11-15 NOTE — PHYSICAL EXAM
[Average] : average [Cooperative] : cooperative [Anxious] : anxious [Full] : full [Clear] : clear [Linear/Goal Directed] : linear/goal directed [None] : none [Preoccupations/Ruminations] : preoccupations/ruminations [Obsessional] : obsessional [Depressive] : depressive [None Reported] : none reported [Above average] : above average [WNL] : within normal limits

## 2022-11-15 NOTE — HISTORY OF PRESENT ILLNESS
[FreeTextEntry1] : Intake: Patient is a 28 yo female, domiciled with bio parents and younger brother, currently working as pediatric nurse at [employer], currently in outpatient treatment with online therapist for anxiety and depression, no prior psychiatric hospitalization, no self-injury or suicide attempts, no aggression/violence, no legal issues, no CPS involvement,[facility] sig for anxiety, presenting today for diagnostic evaluation and medication initiation.\par \par Pt explains she's always been an anxious person, recalls significant separation anxiety in elementary school through 4th grade. At that time parent sought help from [facility] counselor for help with separation anxiety coping which pt found helpful; Reports in middle and high school improvement, however continued to recall "stressful time" in school regarding academic high achievement and test taking anxiety; Pt did very well in school without any additional educational supports, then attended nursing school in [place] where again did very well however recalls this again as a very stressful time. 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 Additional this summer broke up with boyfriend of 8 months, and work related stressors; Adds she has supports at home and work however found herself crying easily "worrying about everything all the time" and describes panic attacks becoming more frequent, last occurred 2 days ago. She noted over last few months started to isolate herself, loss of interests in hangout out with friends or family and close coworkers noted her increase in excessive handwashing behaviors. Pt sought online therapy help with hopes of starting CBT format soon, currently only supportive talk therapy, pt is interested in [facility] referral; \par Pt denies hx of psychosis/ladonna/abuse/ED/ADHD/substance abuse. \par  \par  [FreeTextEntry3] : none

## 2022-11-15 NOTE — DISCUSSION/SUMMARY
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

## 2022-11-15 NOTE — SOCIAL HISTORY
[FreeTextEntry1] : raised in [place], live with bio parents and younger brother; attended [employer] through grade school did very well; attended nursing school in [place], "good student"; reports good relationship with family and supportive friends; Has been in 2 previous romantic relationships, none current; \par Substance use: alcohol socially; reports one episode of 2 days of excessive drinking hx bottle of wine a day in context of a romantic breakup without impairment or consequences ; denies any regular drinking currently \par  \par \par

## 2022-11-15 NOTE — PLAN
[No] : No [Medication education provided] : Medication education provided. [Rationale for medication choices, possible risks/precautions, benefits, alternative treatment choices, and consequences of non-treatment discussed] : Rationale for medication choices, possible risks/precautions, benefits, alternative treatment choices, and consequences of non-treatment discussed with patient/family/caregiver  [FreeTextEntry5] : -psychoeducation about diagnosis and treatment modalities \par - resources: iocdf.org\par -Continue ERP for OCD \par -Lab/other tests: appreciate coordination of care with PMD, TSH screen wnl \par -Medication: Continue Sertraline 50mg \par -Safety:Educated patient of importance of remaining abstinent from drugs and alcohol; Emergency procedures were discussed\par -Patient given opportunity to ask questions and their questions were answered and they expressed understanding and agreement with above plan.\par -Follow up: 2 months \par

## 2022-11-23 ENCOUNTER — APPOINTMENT (OUTPATIENT)
Dept: PSYCHIATRY | Facility: CLINIC | Age: 27
End: 2022-11-23

## 2022-11-23 PROCEDURE — 90837 PSYTX W PT 60 MINUTES: CPT | Mod: 95

## 2022-11-23 NOTE — PLAN
[FreeTextEntry2] : CBT for OCD. [Cognitive and/or Behavior Therapy] : Cognitive and/or Behavior Therapy  [de-identified] : Pt was Ox3. Pt's affect was observed to be euthymic. No risk was observed or reported.\par \par Psychologist met with Pt using teletherapy platform due to COVID-19 pandemic disaster. Pt was at their home, while psychologist was working remotely. Pt continued to provide consent for telehealth services.\par \par In session, continued exposures. Pt touched high touch areas in her bathroom (faucet, toilet flusher, top of soap dispenser and faucet), spread to her clothes ad hair. Pt rated SUDS as 5/10. Pt did wash hands. Spread contaminants on clothes to her dining room chair, couch and walls. Spread contaminants to her dog. Pt rated openness to distress as 5/10. Pt completed exposure of removing necklace. Pt reported mild distress. Discussed how to spread contaminants and respond to mental compulsions with uncertainty. HW assigned for pt to repeat exposures from session and set table for thanksgiving without gloves. Next session will continue with exposures. [FreeTextEntry1] : Treatment plan reviewed for psycho-education include the following:\par 1. Pt will learn about their symptoms and diagnosis and be able to explain it back to psychologist.\par 2. Pt will learn about the CBT model and be able to accurately categorize their symptoms and diagnosis in terms of cognition, emotional responses, behaviors, and physiological arousal.\par Treatment planning reviewed for exposure and response prevention included the following:\par 1. Pt can benefit from learning/continuing exposure therapy interventions, involving confronting situational, emotional, physiological, and cognitive avoided situations. \par 2. Pt can benefit from reducing and eliminating safety behaviors/objects, such as maladaptive response/ritualization prevention, removing "just in case" items that maintain anxiety, panic, obsessive, and post-traumatic symptoms. \par 3. Exposure therapy involves any or all of these modalities: in vivo, imaginal, interoceptive, Virtual Reality, written/narrative, and analog in vivo exposure\par 4. Exposures will be conducted within psychotherapy sessions and assigned for homework between sessions\par

## 2022-11-30 ENCOUNTER — APPOINTMENT (OUTPATIENT)
Dept: PSYCHIATRY | Facility: CLINIC | Age: 27
End: 2022-11-30

## 2022-11-30 PROCEDURE — 90837 PSYTX W PT 60 MINUTES: CPT | Mod: 95

## 2022-11-30 NOTE — PLAN
[FreeTextEntry2] : CBT for OCD. [Cognitive and/or Behavior Therapy] : Cognitive and/or Behavior Therapy  [de-identified] : Pt was Ox3. Pt's affect was observed to be euthymic. No risk was observed or reported.\par \par Psychologist met with Pt using teletherapy platform due to COVID-19 pandemic disaster. Pt was at their home, while psychologist was working remotely. Pt continued to provide consent for telehealth services.\par \par Pt did not complete exposure of setting table for thanksgiving because reported high distress. Pt did complete exposures of touching other high touch areas of the home, resisting cleaning and allowing guests to touch items she normally would not permit. In session, continued exposures. Pt touched high touch areas in her kitchen and living room (tv remote, doorknobs, faucet). Pt reported moderate anxiety. Pt touch garbage pale in the kitchen. Pt reported high distress. Pt rated openness to distress as 6/10 after 3 minutes but was not willing to spread from hand to other high touch areas. Pt reported feeling as though contaminant spread to wrist and sleeve. Pt touched sleeve to doorknob. Pt reported SUDS as 9/10 peak and dropped to 6/10 after 3 minutes. Discussed resisting compulsions of washing or wiping after. Pt touched fitbit and placed on not high touch shelf. HW assigned for pt to resist wiping doorknob until after father comes home from work, minimize washing exposures, continue to spread contaminant via cross contamination. Next session will continue with exposures. [FreeTextEntry1] : Treatment plan reviewed for psycho-education include the following:\par 1. Pt will learn about their symptoms and diagnosis and be able to explain it back to psychologist.\par 2. Pt will learn about the CBT model and be able to accurately categorize their symptoms and diagnosis in terms of cognition, emotional responses, behaviors, and physiological arousal.\par Treatment planning reviewed for exposure and response prevention included the following:\par 1. Pt can benefit from learning/continuing exposure therapy interventions, involving confronting situational, emotional, physiological, and cognitive avoided situations. \par 2. Pt can benefit from reducing and eliminating safety behaviors/objects, such as maladaptive response/ritualization prevention, removing "just in case" items that maintain anxiety, panic, obsessive, and post-traumatic symptoms. \par 3. Exposure therapy involves any or all of these modalities: in vivo, imaginal, interoceptive, Virtual Reality, written/narrative, and analog in vivo exposure\par 4. Exposures will be conducted within psychotherapy sessions and assigned for homework between sessions\par

## 2022-12-07 ENCOUNTER — APPOINTMENT (OUTPATIENT)
Dept: PSYCHIATRY | Facility: CLINIC | Age: 27
End: 2022-12-07

## 2022-12-14 ENCOUNTER — APPOINTMENT (OUTPATIENT)
Dept: PSYCHIATRY | Facility: CLINIC | Age: 27
End: 2022-12-14

## 2022-12-14 PROCEDURE — 90837 PSYTX W PT 60 MINUTES: CPT | Mod: 95

## 2022-12-14 NOTE — PLAN
[FreeTextEntry2] : CBT for OCD. [Cognitive and/or Behavior Therapy] : Cognitive and/or Behavior Therapy  [de-identified] : Pt was Ox3. Pt's affect was observed to be euthymic. No risk was observed or reported.\par \par Psychologist met with Pt using teletherapy platform due to COVID-19 pandemic disaster. Pt was at their home, while psychologist was working remotely. Pt continued to provide consent for telehealth services.\par \par Pt did complete exposures of touching other high touch areas of the home, resisting cleaning and not excessively cleaning when family members touch the same spots. In session, continued exposures. Pt touched her bathroom trash and dirty scrubs in laundry followed by high touch areas in her kitchen and living room (tv remote, doorknobs, faucets, keys, buttons). Pt reported moderate-severe anxiety. Pt rated SUDS at 7/10 and dropped after 2 minutes. Pt reported feeling as though contaminant spread to wrist and sleeve. Discussed resisting mental compulsions. Pt sat with uncertainty of whether or not germs will stay on areas and family will get sick. HW assigned for pt to cross contaminate high touch areas with items she is concerned have blood on them from the hospital i.e. shoes, socks, scrubs and to attempt to undo mental compulsions. Next session will continue with exposures. [FreeTextEntry1] : Treatment plan reviewed for psycho-education include the following:\par 1. Pt will learn about their symptoms and diagnosis and be able to explain it back to psychologist.\par 2. Pt will learn about the CBT model and be able to accurately categorize their symptoms and diagnosis in terms of cognition, emotional responses, behaviors, and physiological arousal.\par Treatment planning reviewed for exposure and response prevention included the following:\par 1. Pt can benefit from learning/continuing exposure therapy interventions, involving confronting situational, emotional, physiological, and cognitive avoided situations. \par 2. Pt can benefit from reducing and eliminating safety behaviors/objects, such as maladaptive response/ritualization prevention, removing "just in case" items that maintain anxiety, panic, obsessive, and post-traumatic symptoms. \par 3. Exposure therapy involves any or all of these modalities: in vivo, imaginal, interoceptive, Virtual Reality, written/narrative, and analog in vivo exposure\par 4. Exposures will be conducted within psychotherapy sessions and assigned for homework between sessions\par

## 2022-12-21 ENCOUNTER — APPOINTMENT (OUTPATIENT)
Dept: PSYCHIATRY | Facility: CLINIC | Age: 27
End: 2022-12-21

## 2022-12-21 PROCEDURE — 90837 PSYTX W PT 60 MINUTES: CPT | Mod: 95

## 2022-12-21 NOTE — PLAN
[FreeTextEntry2] : CBT for OCD. [Cognitive and/or Behavior Therapy] : Cognitive and/or Behavior Therapy  [de-identified] : Pt was Ox3. Pt's affect was observed to be euthymic. No risk was observed or reported.\par \par Psychologist met with Pt using teletherapy platform due to COVID-19 pandemic disaster. Pt was at their home, while psychologist was working remotely. Pt continued to provide consent for telehealth services.\par \par Pt did not complete exposures of touching work items to home items. Pt did attempt to resist mental compulsions. Pt reported imaginal exposures to not increase anxiety by many points. In session discussed contamination response prevention. Pt agreed to limiting cleaning bxs after using the restroom at home including washing soap dispenser, faucets and disinfecting spray. Discussed compulsions related to unlucky numbers. Pt created icons for parents with number she does not like i.e. 246, 606. Discussed doing bxs in numbers she does not like i.e. exercise reps, eating items, making appointments on dates she does not feel comfortable with. HW assigned for pt to complete contamination response prevention exposures, schedule and go to manicure appointment on 12/26, make plans with friend on 12/26 at 1pm (13th hour) and do bxs in even numbers. Next session will continue with exposures. [FreeTextEntry1] : Treatment plan reviewed for psycho-education include the following:\par 1. Pt will learn about their symptoms and diagnosis and be able to explain it back to psychologist.\par 2. Pt will learn about the CBT model and be able to accurately categorize their symptoms and diagnosis in terms of cognition, emotional responses, behaviors, and physiological arousal.\par Treatment planning reviewed for exposure and response prevention included the following:\par 1. Pt can benefit from learning/continuing exposure therapy interventions, involving confronting situational, emotional, physiological, and cognitive avoided situations. \par 2. Pt can benefit from reducing and eliminating safety behaviors/objects, such as maladaptive response/ritualization prevention, removing "just in case" items that maintain anxiety, panic, obsessive, and post-traumatic symptoms. \par 3. Exposure therapy involves any or all of these modalities: in vivo, imaginal, interoceptive, Virtual Reality, written/narrative, and analog in vivo exposure\par 4. Exposures will be conducted within psychotherapy sessions and assigned for homework between sessions\par

## 2022-12-28 ENCOUNTER — APPOINTMENT (OUTPATIENT)
Dept: PSYCHIATRY | Facility: CLINIC | Age: 27
End: 2022-12-28

## 2022-12-29 ENCOUNTER — APPOINTMENT (OUTPATIENT)
Dept: PSYCHIATRY | Facility: CLINIC | Age: 27
End: 2022-12-29

## 2022-12-29 PROCEDURE — 90837 PSYTX W PT 60 MINUTES: CPT | Mod: 95

## 2022-12-29 NOTE — PLAN
[Cognitive and/or Behavior Therapy] : Cognitive and/or Behavior Therapy  [FreeTextEntry2] : CBT for OCD. [de-identified] : Pt was Ox3. Pt's affect was observed to be euthymic. No risk was observed or reported.\par \par Psychologist met with Pt using teletherapy platform due to COVID-19 pandemic disaster. Pt was at their home, while psychologist was working remotely. Pt continued to provide consent for telehealth services.\par \par Pt did complete HW of allowing intrusive number to pop up via rosetta and phone icon, attended plans on dates related to numbers she does not like. Pt struggled to resist compulsions related to doing compulsions in numbers she does not like In session discussed increased obsessional concern related to blood while she is menstruating. Completed exposure of touching outside of bathroom garbage that had used sanitary pads in it and washing hands once once. Pt proceeded to touch items around her house including her bed, pillow, new clothing, light switch, doorknobs and tv remote. Pt's anxiety was severe for 5 minutes after touching new clothes and for 3 minutes after touch other family members items. Tolerance for discomfort increased from 3/10 to 7/10. HW assigned for pt to complete exposures of cleaning her room when still on her period, limit hand washing to once and go out to eat with family while on her period. Next session will continue with exposures. [FreeTextEntry1] : Treatment plan reviewed for psycho-education include the following:\par 1. Pt will learn about their symptoms and diagnosis and be able to explain it back to psychologist.\par 2. Pt will learn about the CBT model and be able to accurately categorize their symptoms and diagnosis in terms of cognition, emotional responses, behaviors, and physiological arousal.\par Treatment planning reviewed for exposure and response prevention included the following:\par 1. Pt can benefit from learning/continuing exposure therapy interventions, involving confronting situational, emotional, physiological, and cognitive avoided situations. \par 2. Pt can benefit from reducing and eliminating safety behaviors/objects, such as maladaptive response/ritualization prevention, removing "just in case" items that maintain anxiety, panic, obsessive, and post-traumatic symptoms. \par 3. Exposure therapy involves any or all of these modalities: in vivo, imaginal, interoceptive, Virtual Reality, written/narrative, and analog in vivo exposure\par 4. Exposures will be conducted within psychotherapy sessions and assigned for homework between sessions\par

## 2023-01-04 ENCOUNTER — APPOINTMENT (OUTPATIENT)
Dept: PSYCHIATRY | Facility: CLINIC | Age: 28
End: 2023-01-04
Payer: COMMERCIAL

## 2023-01-04 PROCEDURE — 90837 PSYTX W PT 60 MINUTES: CPT | Mod: 95

## 2023-01-04 PROCEDURE — 99214 OFFICE O/P EST MOD 30 MIN: CPT | Mod: 95

## 2023-01-04 NOTE — HISTORY OF PRESENT ILLNESS
[FreeTextEntry1] : Pt explains she started taking sertraline 50mg  improvement in anxiety " panic attacks are less, anxiety still there but better"; also starting to do more behavior experiments with ERP therapist which are difficult; when first started zoloft noted initial mild SE of "slight HA" self resolved with rest and tiredness with nursing night shift work change; Has been attending therapy regularly for last month for OCD and anxiety; Currently working on exposure therapy and tolerating well; Targeting   OCD Sx related to contamination and health related reassurance seeking.\par Reports sleep and appetite are good;\par No significant interval events, denies interval panic attacks or worsening depressive episodes, interested to trial titration dose to target OCD thoughts. \par  [FreeTextEntry2] : Reviewed, unchanged since last visits

## 2023-01-04 NOTE — REASON FOR VISIT
[Home] : at home, [unfilled] , at the time of the visit. [Medical Office: ([facility])___] : at the medical office located in  [Self] : self [Patient] : Patient [FreeTextEntry4] : Patient seen virtually, patient or guardian consents to conduct this via two-way video HIPPA compliant [employer].  [FreeTextEntry1] : OCD,  anxiety

## 2023-01-04 NOTE — DISCUSSION/SUMMARY
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

## 2023-01-04 NOTE — PLAN
[FreeTextEntry2] : CBT for OCD. [Cognitive and/or Behavior Therapy] : Cognitive and/or Behavior Therapy  [de-identified] : Pt was Ox3. Pt's affect was observed to be euthymic. No risk was observed or reported.\par \par Psychologist met with Pt using teletherapy platform due to COVID-19 pandemic disaster. Pt was at their home, while psychologist was working remotely. Pt continued to provide consent for telehealth services.\par \par Pt did complete HW of going to dinner with her family and limiting washing to one time. Pt reported difficult day because she accidentally broke a glass and cut her hand which led to bleeding. Pt reported being able to clean up the glass and blood but struggled to continue cleaning out her room. In session, completed exposures including touching the garbage can, touching boxes of clothing to donate, adding clothing to donation box, removing vacuum from room and touching personal items all while resisting washing her hands or changing her clothes. Pt was able to remove trash bag from her room as well as box with the broken glass. Pt reported moderate levels of anxiety and built tolerance for each exposure after 1 minute. pt agreed to to resist changing her clothes until she was done cleaning her room and left her home for the evening. HW assigned for pt to complete exposures of continuing to cleaning her room, limit hand washing to once and continue responding to OCD thoughts with uncertainty.. Next session will continue with exposures. [FreeTextEntry1] : Treatment plan reviewed for psycho-education include the following:\par 1. Pt will learn about their symptoms and diagnosis and be able to explain it back to psychologist.\par 2. Pt will learn about the CBT model and be able to accurately categorize their symptoms and diagnosis in terms of cognition, emotional responses, behaviors, and physiological arousal.\par Treatment planning reviewed for exposure and response prevention included the following:\par 1. Pt can benefit from learning/continuing exposure therapy interventions, involving confronting situational, emotional, physiological, and cognitive avoided situations. \par 2. Pt can benefit from reducing and eliminating safety behaviors/objects, such as maladaptive response/ritualization prevention, removing "just in case" items that maintain anxiety, panic, obsessive, and post-traumatic symptoms. \par 3. Exposure therapy involves any or all of these modalities: in vivo, imaginal, interoceptive, Virtual Reality, written/narrative, and analog in vivo exposure\par 4. Exposures will be conducted within psychotherapy sessions and assigned for homework between sessions\par

## 2023-01-04 NOTE — SOCIAL HISTORY
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

## 2023-01-11 ENCOUNTER — APPOINTMENT (OUTPATIENT)
Dept: PSYCHIATRY | Facility: CLINIC | Age: 28
End: 2023-01-11

## 2023-01-18 ENCOUNTER — APPOINTMENT (OUTPATIENT)
Dept: PSYCHIATRY | Facility: CLINIC | Age: 28
End: 2023-01-18
Payer: COMMERCIAL

## 2023-01-18 PROCEDURE — 90837 PSYTX W PT 60 MINUTES: CPT | Mod: 95

## 2023-01-18 NOTE — PLAN
[Cognitive and/or Behavior Therapy] : Cognitive and/or Behavior Therapy  [FreeTextEntry2] : CBT for OCD. [de-identified] : Pt was Ox3. Pt's affect was observed to be euthymic. No risk was observed or reported.\par \par Psychologist met with Pt using teletherapy platform due to COVID-19 pandemic disaster. Pt was at their home, while psychologist was working remotely. Pt continued to provide consent for telehealth services.\par \par Pt did complete HW of completing cleaning her room, resisted cleaning areas she touched after and resisted washing faucet knobs when washing hands. In session, completed exposures including putting clothing she felt was contaminated back into her closet and sorted work office supplies that she felt was contaminated. Pt reported SUDS of 9/10 and dropped to 6/10. Pt reported high openness to discomfort. Discussed necessity of consistent exposures in order to maintain progress. Planned exposure for the week including work party and resisting washing when on her period of she does not see blood on her skin. HW assigned for pt to complete exposures of continuing to cleaning her room, limit excessive hand washing and go to work party when on her period. Next session will continue with exposures (grocery store). [FreeTextEntry1] : Treatment plan reviewed for psycho-education include the following:\par 1. Pt will learn about their symptoms and diagnosis and be able to explain it back to psychologist.\par 2. Pt will learn about the CBT model and be able to accurately categorize their symptoms and diagnosis in terms of cognition, emotional responses, behaviors, and physiological arousal.\par Treatment planning reviewed for exposure and response prevention included the following:\par 1. Pt can benefit from learning/continuing exposure therapy interventions, involving confronting situational, emotional, physiological, and cognitive avoided situations. \par 2. Pt can benefit from reducing and eliminating safety behaviors/objects, such as maladaptive response/ritualization prevention, removing "just in case" items that maintain anxiety, panic, obsessive, and post-traumatic symptoms. \par 3. Exposure therapy involves any or all of these modalities: in vivo, imaginal, interoceptive, Virtual Reality, written/narrative, and analog in vivo exposure\par 4. Exposures will be conducted within psychotherapy sessions and assigned for homework between sessions\par

## 2023-01-25 ENCOUNTER — APPOINTMENT (OUTPATIENT)
Dept: PSYCHIATRY | Facility: CLINIC | Age: 28
End: 2023-01-25
Payer: COMMERCIAL

## 2023-01-25 PROCEDURE — 90837 PSYTX W PT 60 MINUTES: CPT | Mod: 95

## 2023-01-25 NOTE — PLAN
[FreeTextEntry2] : CBT for OCD. [Cognitive and/or Behavior Therapy] : Cognitive and/or Behavior Therapy  [de-identified] : Pt was Ox3. Pt's affect was observed to be euthymic. No risk was observed or reported.\par \par Psychologist met with Pt using teletherapy platform due to COVID-19 pandemic disaster. Pt was at their home, while psychologist was working remotely. Pt continued to provide consent for telehealth services.\par \par Pt did complete HW of completing cleaning her room, resisted cleaning areas she touched after and resisted washing faucet knobs when washing hands and went to work holiday party when she had her period. In session, completed exposures at the grocery store of touching items that she did not purchase, bought loose items of produce instead of pre-bagged items, smelled items that she did not buy and tops of cans and bags that others will likely touch the same area. SUDS ranged from 6-9/10. SUDS dropped after a minute for each by 2 points on average and pt reported distress becoming more tolerable. Planned exposures for the week including eating produce she bought, limiting washing produce to 1x, resisting washing produce for family, putting lemon with rind in drink and using more equipment at the gym. HW assigned for pt to complete exposures of agreed grocery exposures and using more equipment at the gym. Next session will follow up on grocery and gym exposures and discuss family involvement. [FreeTextEntry1] : Treatment plan reviewed for psycho-education include the following:\par 1. Pt will learn about their symptoms and diagnosis and be able to explain it back to psychologist.\par 2. Pt will learn about the CBT model and be able to accurately categorize their symptoms and diagnosis in terms of cognition, emotional responses, behaviors, and physiological arousal.\par Treatment planning reviewed for exposure and response prevention included the following:\par 1. Pt can benefit from learning/continuing exposure therapy interventions, involving confronting situational, emotional, physiological, and cognitive avoided situations. \par 2. Pt can benefit from reducing and eliminating safety behaviors/objects, such as maladaptive response/ritualization prevention, removing "just in case" items that maintain anxiety, panic, obsessive, and post-traumatic symptoms. \par 3. Exposure therapy involves any or all of these modalities: in vivo, imaginal, interoceptive, Virtual Reality, written/narrative, and analog in vivo exposure\par 4. Exposures will be conducted within psychotherapy sessions and assigned for homework between sessions\par

## 2023-01-31 ENCOUNTER — APPOINTMENT (OUTPATIENT)
Dept: PSYCHIATRY | Facility: CLINIC | Age: 28
End: 2023-01-31
Payer: COMMERCIAL

## 2023-01-31 PROCEDURE — 99214 OFFICE O/P EST MOD 30 MIN: CPT | Mod: 95

## 2023-01-31 RX ORDER — METHYLPREDNISOLONE 4 MG/1
4 TABLET ORAL
Qty: 21 | Refills: 0 | Status: DISCONTINUED | COMMUNITY
Start: 2020-02-11 | End: 2023-01-31

## 2023-01-31 RX ORDER — METHYLPREDNISOLONE 4 MG/1
4 TABLET ORAL
Qty: 21 | Refills: 0 | Status: DISCONTINUED | COMMUNITY
Start: 2021-01-19 | End: 2023-01-31

## 2023-01-31 RX ORDER — ACETAMINOPHEN AND CODEINE PHOSPHATE 120; 12 MG/5ML; MG/5ML
120-12 SOLUTION ORAL
Qty: 500 | Refills: 0 | Status: DISCONTINUED | COMMUNITY
Start: 2021-03-30 | End: 2023-01-31

## 2023-01-31 RX ORDER — AMOXICILLIN AND CLAVULANATE POTASSIUM 875; 125 MG/1; MG/1
875-125 TABLET, COATED ORAL
Qty: 14 | Refills: 0 | Status: DISCONTINUED | COMMUNITY
Start: 2020-09-22 | End: 2023-01-31

## 2023-01-31 RX ORDER — FLUTICASONE PROPIONATE 50 UG/1
50 SPRAY, METERED NASAL DAILY
Qty: 1 | Refills: 2 | Status: DISCONTINUED | COMMUNITY
Start: 2019-10-08 | End: 2023-01-31

## 2023-01-31 RX ORDER — AZITHROMYCIN 200 MG/5ML
200 POWDER, FOR SUSPENSION ORAL DAILY
Qty: 1 | Refills: 0 | Status: DISCONTINUED | COMMUNITY
Start: 2021-03-30 | End: 2023-01-31

## 2023-01-31 RX ORDER — METHYLPREDNISOLONE 4 MG/1
4 TABLET ORAL
Qty: 21 | Refills: 0 | Status: DISCONTINUED | COMMUNITY
Start: 2019-10-08 | End: 2023-01-31

## 2023-01-31 RX ORDER — AMOXICILLIN AND CLAVULANATE POTASSIUM 875; 125 MG/1; MG/1
875-125 TABLET, COATED ORAL
Qty: 14 | Refills: 0 | Status: DISCONTINUED | COMMUNITY
Start: 2021-01-19 | End: 2023-01-31

## 2023-01-31 RX ORDER — AMOXICILLIN AND CLAVULANATE POTASSIUM 875; 125 MG/1; MG/1
875-125 TABLET, COATED ORAL
Qty: 14 | Refills: 0 | Status: DISCONTINUED | COMMUNITY
Start: 2020-10-12 | End: 2023-01-31

## 2023-01-31 RX ORDER — METHYLPREDNISOLONE 4 MG/1
4 TABLET ORAL
Qty: 21 | Refills: 1 | Status: DISCONTINUED | COMMUNITY
Start: 2020-10-12 | End: 2023-01-31

## 2023-01-31 RX ORDER — METHYLPREDNISOLONE 4 MG/1
4 TABLET ORAL
Qty: 21 | Refills: 0 | Status: DISCONTINUED | COMMUNITY
Start: 2020-09-22 | End: 2023-01-31

## 2023-01-31 NOTE — HISTORY OF PRESENT ILLNESS
[FreeTextEntry1] : Pt explains she started taking sertraline 75mg improvement in anxiety " panic attacks are less, anxiety still there but better"; also starting to do more behavior experiments with ERP therapist which are difficult; when first started zoloft noted initial mild SE of "slight HA" self resolved with rest and tiredness with nursing night shift work change; Has been attending therapy regularly for last 2 month for OCD and anxiety; Currently working on exposure therapy and tolerating well; Targeting OCD Sx related to contamination and health related reassurance seeking.\par Reports sleep and appetite are good;\par No significant interval events, denies interval panic attacks or worsening depressive episodes, interested to continue current  dose to target OCD thoughts. \par  \par  [FreeTextEntry2] : Reviewed, unchanged since last visits

## 2023-01-31 NOTE — REASON FOR VISIT
[Home] : at home, [unfilled] , at the time of the visit. [Medical Office: ([facility])___] : at the medical office located in  [Patient] : the patient [Self] : self [FreeTextEntry4] : Patient seen virtually, patient or guardian consents to conduct this via two-way video HIPPA compliant [employer].  [FreeTextEntry1] : OCD, anxiety

## 2023-01-31 NOTE — SOCIAL HISTORY
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

## 2023-01-31 NOTE — FAMILY HISTORY
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

## 2023-01-31 NOTE — PLAN
[No] : No [Medication education provided] : Medication education provided. [Rationale for medication choices, possible risks/precautions, benefits, alternative treatment choices, and consequences of non-treatment discussed] : Rationale for medication choices, possible risks/precautions, benefits, alternative treatment choices, and consequences of non-treatment discussed with patient/family/caregiver  [FreeTextEntry5] : -psychoeducation about diagnosis and treatment modalities \par - resources: iocdf.org\par -Continue ERP for OCD \par -Lab/other tests: appreciate coordination of care with PMD, TSH screen wnl \par -Medication: Sertraline   75mg \par -Safety:Educated patient of importance of remaining abstinent from drugs and alcohol; Emergency procedures were discussed\par -Patient given opportunity to ask questions and their questions were answered and they expressed understanding and agreement with above plan.\par -Follow up: 2 months \par

## 2023-02-03 ENCOUNTER — APPOINTMENT (OUTPATIENT)
Dept: PSYCHIATRY | Facility: CLINIC | Age: 28
End: 2023-02-03
Payer: COMMERCIAL

## 2023-02-03 PROCEDURE — 90837 PSYTX W PT 60 MINUTES: CPT | Mod: 95

## 2023-02-03 NOTE — PLAN
[FreeTextEntry2] : CBT for OCD. [Cognitive and/or Behavior Therapy] : Cognitive and/or Behavior Therapy  [de-identified] : Pt was Ox3. Pt's affect was observed to be euthymic. No risk was observed or reported.\par \par Psychologist met with Pt using teletherapy platform due to COVID-19 pandemic disaster. Pt was at their home, while psychologist was working remotely. Pt continued to provide consent for telehealth services.\par \par Pt did complete HW of eating groceries she bought and limiting washing fruits she ate to 1 time. In session, completed exposures discussed what a family session would include. Pt reported parent needing more psychoeducation about the cycle of OCD. Pt is in agreement with family rose. Completed exposure of touching her dirty socks to the couch where family members sit and petting her dog. Discussed targeting core fears with script writing. HW assigned for pt to complete exposures of touching floors with dirty socks, bed with dirty socks and allowing dog to touch bedding. Next session will conduct family session with pt and parent. [FreeTextEntry1] : Treatment plan reviewed for psycho-education include the following:\par 1. Pt will learn about their symptoms and diagnosis and be able to explain it back to psychologist.\par 2. Pt will learn about the CBT model and be able to accurately categorize their symptoms and diagnosis in terms of cognition, emotional responses, behaviors, and physiological arousal.\par Treatment planning reviewed for exposure and response prevention included the following:\par 1. Pt can benefit from learning/continuing exposure therapy interventions, involving confronting situational, emotional, physiological, and cognitive avoided situations. \par 2. Pt can benefit from reducing and eliminating safety behaviors/objects, such as maladaptive response/ritualization prevention, removing "just in case" items that maintain anxiety, panic, obsessive, and post-traumatic symptoms. \par 3. Exposure therapy involves any or all of these modalities: in vivo, imaginal, interoceptive, Virtual Reality, written/narrative, and analog in vivo exposure\par 4. Exposures will be conducted within psychotherapy sessions and assigned for homework between sessions\par

## 2023-02-08 ENCOUNTER — APPOINTMENT (OUTPATIENT)
Dept: PSYCHIATRY | Facility: CLINIC | Age: 28
End: 2023-02-08
Payer: COMMERCIAL

## 2023-02-08 PROCEDURE — 90847 FAMILY PSYTX W/PT 50 MIN: CPT | Mod: 95

## 2023-02-08 NOTE — PLAN
[FreeTextEntry2] : CBT for OCD. [Cognitive and/or Behavior Therapy] : Cognitive and/or Behavior Therapy  [de-identified] : Pt was Ox3. Pt's affect was observed to be euthymic. No risk was observed or reported.\par \par Psychologist met with Pt using teletherapy platform due to COVID-19 pandemic disaster. Pt was at their home, while psychologist was working remotely. Pt continued to provide consent for telehealth services.\par \par Met with pt and mother. Reviewed psychoeducation about OCD and ERP. Reviewed the cycle of OCD and how to break the cycle. Discussed avoidance, reassurance and family accommodation. Parent asked appropriate questions and as able to provide examples of family accommodations including extra dog walking, doing pt's work laundry, cleaning bathroom more frequently etc. Reviewed behavioral rose and how to respond to OCD. Provided information about the biosocial theory and how the trigger for OCD is unknown but certain but there is no need for blame or guilt. Next session will meet with pt and continue exposures. [FreeTextEntry1] : Treatment plan reviewed for psycho-education include the following:\par 1. Pt will learn about their symptoms and diagnosis and be able to explain it back to psychologist.\par 2. Pt will learn about the CBT model and be able to accurately categorize their symptoms and diagnosis in terms of cognition, emotional responses, behaviors, and physiological arousal.\par Treatment planning reviewed for exposure and response prevention included the following:\par 1. Pt can benefit from learning/continuing exposure therapy interventions, involving confronting situational, emotional, physiological, and cognitive avoided situations. \par 2. Pt can benefit from reducing and eliminating safety behaviors/objects, such as maladaptive response/ritualization prevention, removing "just in case" items that maintain anxiety, panic, obsessive, and post-traumatic symptoms. \par 3. Exposure therapy involves any or all of these modalities: in vivo, imaginal, interoceptive, Virtual Reality, written/narrative, and analog in vivo exposure\par 4. Exposures will be conducted within psychotherapy sessions and assigned for homework between sessions\par

## 2023-02-08 NOTE — REASON FOR VISIT
[Patient with collateral] : Patient with collateral  [Mother] : mother [Home] : at home, [unfilled] , at the time of the visit. [Other Location: e.g. Home (Enter Location, City,State)___] : at [unfilled]

## 2023-02-15 ENCOUNTER — APPOINTMENT (OUTPATIENT)
Dept: PSYCHIATRY | Facility: CLINIC | Age: 28
End: 2023-02-15
Payer: COMMERCIAL

## 2023-02-15 PROCEDURE — 90837 PSYTX W PT 60 MINUTES: CPT | Mod: 95

## 2023-02-15 NOTE — PLAN
[FreeTextEntry2] : CBT for OCD. [Cognitive and/or Behavior Therapy] : Cognitive and/or Behavior Therapy  [de-identified] : Pt was Ox3. Pt's affect was observed to be euthymic. No risk was observed or reported.\par \par Psychologist met with Pt using teletherapy platform due to COVID-19 pandemic disaster. Pt was at their home, while psychologist was working remotely. Pt continued to provide consent for telehealth services.\par \par Pt did complete HW of agreeing to dry work clothes after mother washes them and  after dog more. In session, problem solved excessive paper use to clean up water from the bathroom. Pt agreed to use a wash cloth instead. Problem solved minimizing compulsive bx when picking up after dog including limiting to using one glove and 2 doggie bags. Completed exposures of petting dog today even though dog is sick. Discussed creating hierarchy revolving fear of urine, feces, blood and discharge. HW assigned for pt to complete exposures of minimizing compulsions when picking up after dog, adjusting cleaning bx in the bathroom and creating hierarchy around feared bacteria/virus discharge.. Next session will continue with exposures. [FreeTextEntry1] : Treatment plan reviewed for psycho-education include the following:\par 1. Pt will learn about their symptoms and diagnosis and be able to explain it back to psychologist.\par 2. Pt will learn about the CBT model and be able to accurately categorize their symptoms and diagnosis in terms of cognition, emotional responses, behaviors, and physiological arousal.\par Treatment planning reviewed for exposure and response prevention included the following:\par 1. Pt can benefit from learning/continuing exposure therapy interventions, involving confronting situational, emotional, physiological, and cognitive avoided situations. \par 2. Pt can benefit from reducing and eliminating safety behaviors/objects, such as maladaptive response/ritualization prevention, removing "just in case" items that maintain anxiety, panic, obsessive, and post-traumatic symptoms. \par 3. Exposure therapy involves any or all of these modalities: in vivo, imaginal, interoceptive, Virtual Reality, written/narrative, and analog in vivo exposure\par 4. Exposures will be conducted within psychotherapy sessions and assigned for homework between sessions\par

## 2023-02-22 ENCOUNTER — APPOINTMENT (OUTPATIENT)
Dept: PSYCHIATRY | Facility: CLINIC | Age: 28
End: 2023-02-22
Payer: COMMERCIAL

## 2023-02-22 PROCEDURE — 90837 PSYTX W PT 60 MINUTES: CPT | Mod: 95

## 2023-02-22 NOTE — PLAN
[FreeTextEntry2] : CBT for OCD. [Cognitive and/or Behavior Therapy] : Cognitive and/or Behavior Therapy  [de-identified] : Pt was Ox3. Pt's affect was observed to be euthymic. No risk was observed or reported.\par \par Psychologist met with Pt using teletherapy platform due to COVID-19 pandemic disaster. Pt was at their home, while psychologist was working remotely. Pt continued to provide consent for telehealth services.\par \par Pt did not complete HW of writing fear hierarchy. Pt did work on picking up after dog more. In session,discussed challenge of fighting OCD routinely. Discussed fear of how it might affect life in the future. Normalized concerns. Provided resources for support including support group and OCD Stories. Brainstormed ideas of fear hierarchy related to urine. HW assigned for pt to complete exposures of deleting websites used for reassurance and minimizing reassurance seeking. Next session will continue with exposures related to the bathroom. [FreeTextEntry1] : Treatment plan reviewed for psycho-education include the following:\par 1. Pt will learn about their symptoms and diagnosis and be able to explain it back to psychologist.\par 2. Pt will learn about the CBT model and be able to accurately categorize their symptoms and diagnosis in terms of cognition, emotional responses, behaviors, and physiological arousal.\par Treatment planning reviewed for exposure and response prevention included the following:\par 1. Pt can benefit from learning/continuing exposure therapy interventions, involving confronting situational, emotional, physiological, and cognitive avoided situations. \par 2. Pt can benefit from reducing and eliminating safety behaviors/objects, such as maladaptive response/ritualization prevention, removing "just in case" items that maintain anxiety, panic, obsessive, and post-traumatic symptoms. \par 3. Exposure therapy involves any or all of these modalities: in vivo, imaginal, interoceptive, Virtual Reality, written/narrative, and analog in vivo exposure\par 4. Exposures will be conducted within psychotherapy sessions and assigned for homework between sessions\par

## 2023-03-01 ENCOUNTER — APPOINTMENT (OUTPATIENT)
Dept: PSYCHIATRY | Facility: CLINIC | Age: 28
End: 2023-03-01
Payer: COMMERCIAL

## 2023-03-01 PROCEDURE — 90837 PSYTX W PT 60 MINUTES: CPT | Mod: 95

## 2023-03-01 NOTE — PLAN
[FreeTextEntry2] : CBT for OCD. [Cognitive and/or Behavior Therapy] : Cognitive and/or Behavior Therapy  [de-identified] : Pt was Ox3. Pt's affect was observed to be euthymic. No risk was observed or reported.\par \par Psychologist met with Pt using teletherapy platform due to COVID-19 pandemic disaster. Pt was at their home, while psychologist was working remotely. Pt continued to provide consent for telehealth services.\par \par Pt did complete HW of reducing hand washing and began listening to OCD stories podcast. In session, completed exposures of touching trash can and toilet roll fixture in the bathroom and spreading to high touch surfaces. SUDS ranged from 6-9/10. Tolerance level increased after 2 minutes. Pt agreed to resist hand washing until she needed to go to the bathroom next. Discussed exposures for the week. HW assigned for pt to complete exposures of emptying bathroom trash, tearing toilet paper with 2 hands and washing her own laundry independently. Next session will continue with exposures related to the bathroom. [FreeTextEntry1] : Treatment plan reviewed for psycho-education include the following:\par 1. Pt will learn about their symptoms and diagnosis and be able to explain it back to psychologist.\par 2. Pt will learn about the CBT model and be able to accurately categorize their symptoms and diagnosis in terms of cognition, emotional responses, behaviors, and physiological arousal.\par Treatment planning reviewed for exposure and response prevention included the following:\par 1. Pt can benefit from learning/continuing exposure therapy interventions, involving confronting situational, emotional, physiological, and cognitive avoided situations. \par 2. Pt can benefit from reducing and eliminating safety behaviors/objects, such as maladaptive response/ritualization prevention, removing "just in case" items that maintain anxiety, panic, obsessive, and post-traumatic symptoms. \par 3. Exposure therapy involves any or all of these modalities: in vivo, imaginal, interoceptive, Virtual Reality, written/narrative, and analog in vivo exposure\par 4. Exposures will be conducted within psychotherapy sessions and assigned for homework between sessions\par

## 2023-03-08 ENCOUNTER — APPOINTMENT (OUTPATIENT)
Dept: PSYCHIATRY | Facility: CLINIC | Age: 28
End: 2023-03-08
Payer: COMMERCIAL

## 2023-03-08 PROCEDURE — 90837 PSYTX W PT 60 MINUTES: CPT | Mod: 95

## 2023-03-08 NOTE — PLAN
[FreeTextEntry2] : CBT for OCD. [Cognitive and/or Behavior Therapy] : Cognitive and/or Behavior Therapy  [de-identified] : Pt was Ox3. Pt's affect was observed to be euthymic. No risk was observed or reported.\par \par Psychologist met with Pt using teletherapy platform due to COVID-19 pandemic disaster. Pt was at their home, while psychologist was working remotely. Pt continued to provide consent for telehealth services.\par \par Pt did complete HW of doing laundry independently and emptying all trash. In session, completed exposures of touching toilet flusher, toilet roll and handle of toilet plunger and spreading to high touch surfaces. SUDS ranged from 7-9/10. Tolerance level increased after 5 minutes. Pt agreed to resist hand washing until she needed to go to the bathroom next. Pt completed exposure of walking around home barefoot and then putting her socks back on. SUDS rated as 9/10 and dropped after 5 minutes. Discussed exposures for the week. HW assigned for pt to complete exposures of washing her own laundry independently without gloves for one load, clean the shower and toilet and walk in the bathroom barefoot before putting on socks. Next session will continue with exposures related to the bathroom. [FreeTextEntry1] : Treatment plan reviewed for psycho-education include the following:\par 1. Pt will learn about their symptoms and diagnosis and be able to explain it back to psychologist.\par 2. Pt will learn about the CBT model and be able to accurately categorize their symptoms and diagnosis in terms of cognition, emotional responses, behaviors, and physiological arousal.\par Treatment planning reviewed for exposure and response prevention included the following:\par 1. Pt can benefit from learning/continuing exposure therapy interventions, involving confronting situational, emotional, physiological, and cognitive avoided situations. \par 2. Pt can benefit from reducing and eliminating safety behaviors/objects, such as maladaptive response/ritualization prevention, removing "just in case" items that maintain anxiety, panic, obsessive, and post-traumatic symptoms. \par 3. Exposure therapy involves any or all of these modalities: in vivo, imaginal, interoceptive, Virtual Reality, written/narrative, and analog in vivo exposure\par 4. Exposures will be conducted within psychotherapy sessions and assigned for homework between sessions\par

## 2023-03-15 ENCOUNTER — APPOINTMENT (OUTPATIENT)
Dept: PSYCHIATRY | Facility: CLINIC | Age: 28
End: 2023-03-15

## 2023-03-15 ENCOUNTER — APPOINTMENT (OUTPATIENT)
Dept: PSYCHIATRY | Facility: CLINIC | Age: 28
End: 2023-03-15
Payer: COMMERCIAL

## 2023-03-15 PROCEDURE — 90837 PSYTX W PT 60 MINUTES: CPT | Mod: 95

## 2023-03-15 NOTE — PLAN
[FreeTextEntry2] : CBT for OCD. [Cognitive and/or Behavior Therapy] : Cognitive and/or Behavior Therapy  [de-identified] : Pt was Ox3. Pt's affect was observed to be euthymic. No risk was observed or reported.\par \par Psychologist met with Pt using teletherapy platform due to COVID-19 pandemic disaster. Pt was at their home, while psychologist was working remotely. Pt continued to provide consent for telehealth services.\par \par Pt did complete HW of doing laundry without gloves for 2 loads and walking around house barefoot. In session, completed exposures of touching paper towels on thte outside of the roll in the bathroom and limited washing for 20 minutes and emptying bathroom garbage with used sanitary pads in there.. SUDS ranged from 7-10/10. Tolerance level increased after 10 minutes. Discussed exposures for the week. HW assigned for pt to complete exposures of washing her own laundry independently without gloves, clean the shower and toilet, use outside of paper towel roll and walk in the bathroom barefoot before putting on socks. Next session will continue with exposures related to the bathroom. [FreeTextEntry1] : Treatment plan reviewed for psycho-education include the following:\par 1. Pt will learn about their symptoms and diagnosis and be able to explain it back to psychologist.\par 2. Pt will learn about the CBT model and be able to accurately categorize their symptoms and diagnosis in terms of cognition, emotional responses, behaviors, and physiological arousal.\par Treatment planning reviewed for exposure and response prevention included the following:\par 1. Pt can benefit from learning/continuing exposure therapy interventions, involving confronting situational, emotional, physiological, and cognitive avoided situations. \par 2. Pt can benefit from reducing and eliminating safety behaviors/objects, such as maladaptive response/ritualization prevention, removing "just in case" items that maintain anxiety, panic, obsessive, and post-traumatic symptoms. \par 3. Exposure therapy involves any or all of these modalities: in vivo, imaginal, interoceptive, Virtual Reality, written/narrative, and analog in vivo exposure\par 4. Exposures will be conducted within psychotherapy sessions and assigned for homework between sessions\par

## 2023-03-20 ENCOUNTER — APPOINTMENT (OUTPATIENT)
Dept: PSYCHIATRY | Facility: CLINIC | Age: 28
End: 2023-03-20

## 2023-03-22 ENCOUNTER — APPOINTMENT (OUTPATIENT)
Dept: PSYCHIATRY | Facility: CLINIC | Age: 28
End: 2023-03-22

## 2023-03-23 ENCOUNTER — APPOINTMENT (OUTPATIENT)
Dept: PSYCHIATRY | Facility: CLINIC | Age: 28
End: 2023-03-23
Payer: COMMERCIAL

## 2023-03-23 PROCEDURE — 90837 PSYTX W PT 60 MINUTES: CPT | Mod: 95

## 2023-03-23 NOTE — PLAN
[FreeTextEntry2] : CBT for OCD. [Cognitive and/or Behavior Therapy] : Cognitive and/or Behavior Therapy  [de-identified] : Pt was Ox3. Pt's affect was observed to be euthymic. No risk was observed or reported.\par \par Psychologist met with Pt using teletherapy platform due to COVID-19 pandemic disaster. Pt was at their home, while psychologist was working remotely. Pt continued to provide consent for telehealth services.\par \par Pt did complete HW of cleaning bathroom and shower walking around house barefoot. Pt did not use outside of paper towel roll. In session, completed exposures of touching hangers she felt were contaminated and added them to the group of hangers her family uses and spread hand that touched hangers to high-touch areas of the house. SUDS ranged from 5-10/10. Tolerance level increased after 10 minutes. Discussed exposures for the week. HW assigned for pt to complete exposures of walking around barefoot at home and moving clean clothes from basement to clothes where she believes there are contaminated clothes. Next session will continue with exposures related to the bathroom and contaminated clothing. [FreeTextEntry1] : Treatment plan reviewed for psycho-education include the following:\par 1. Pt will learn about their symptoms and diagnosis and be able to explain it back to psychologist.\par 2. Pt will learn about the CBT model and be able to accurately categorize their symptoms and diagnosis in terms of cognition, emotional responses, behaviors, and physiological arousal.\par Treatment planning reviewed for exposure and response prevention included the following:\par 1. Pt can benefit from learning/continuing exposure therapy interventions, involving confronting situational, emotional, physiological, and cognitive avoided situations. \par 2. Pt can benefit from reducing and eliminating safety behaviors/objects, such as maladaptive response/ritualization prevention, removing "just in case" items that maintain anxiety, panic, obsessive, and post-traumatic symptoms. \par 3. Exposure therapy involves any or all of these modalities: in vivo, imaginal, interoceptive, Virtual Reality, written/narrative, and analog in vivo exposure\par 4. Exposures will be conducted within psychotherapy sessions and assigned for homework between sessions\par

## 2023-03-23 NOTE — PLAN
[FreeTextEntry2] : CBT for OCD. [Cognitive and/or Behavior Therapy] : Cognitive and/or Behavior Therapy  [de-identified] : Pt was Ox3. Pt's affect was observed to be euthymic. No risk was observed or reported.\par \par Psychologist met with Pt using teletherapy platform due to COVID-19 pandemic disaster. Pt was at their home, while psychologist was working remotely. Pt continued to provide consent for telehealth services.\par \par Pt did complete HW of cleaning bathroom and shower walking around house barefoot. Pt did not use outside of paper towel roll. In session, completed exposures of touching hangers she felt were contaminated and added them to the group of hangers her family uses and spread hand that touched hangers to high-touch areas of the house. SUDS ranged from 5-10/10. Tolerance level increased after 10 minutes. Discussed exposures for the week. HW assigned for pt to complete exposures of walking around barefoot at home and moving clean clothes from basement to clothes where she believes there are contaminated clothes. Next session will continue with exposures related to the bathroom and contaminated clothing. [FreeTextEntry1] : Treatment plan reviewed for psycho-education include the following:\par 1. Pt will learn about their symptoms and diagnosis and be able to explain it back to psychologist.\par 2. Pt will learn about the CBT model and be able to accurately categorize their symptoms and diagnosis in terms of cognition, emotional responses, behaviors, and physiological arousal.\par Treatment planning reviewed for exposure and response prevention included the following:\par 1. Pt can benefit from learning/continuing exposure therapy interventions, involving confronting situational, emotional, physiological, and cognitive avoided situations. \par 2. Pt can benefit from reducing and eliminating safety behaviors/objects, such as maladaptive response/ritualization prevention, removing "just in case" items that maintain anxiety, panic, obsessive, and post-traumatic symptoms. \par 3. Exposure therapy involves any or all of these modalities: in vivo, imaginal, interoceptive, Virtual Reality, written/narrative, and analog in vivo exposure\par 4. Exposures will be conducted within psychotherapy sessions and assigned for homework between sessions\par

## 2023-03-27 ENCOUNTER — APPOINTMENT (OUTPATIENT)
Dept: PSYCHIATRY | Facility: CLINIC | Age: 28
End: 2023-03-27
Payer: COMMERCIAL

## 2023-03-27 PROCEDURE — 99214 OFFICE O/P EST MOD 30 MIN: CPT | Mod: 95

## 2023-03-27 RX ORDER — SERTRALINE HYDROCHLORIDE 50 MG/1
50 TABLET, FILM COATED ORAL
Qty: 45 | Refills: 2 | Status: ACTIVE | COMMUNITY
Start: 2022-10-05 | End: 1900-01-01

## 2023-03-27 NOTE — PLAN
[No] : No [Medication education provided] : Medication education provided. [Rationale for medication choices, possible risks/precautions, benefits, alternative treatment choices, and consequences of non-treatment discussed] : Rationale for medication choices, possible risks/precautions, benefits, alternative treatment choices, and consequences of non-treatment discussed with patient/family/caregiver  [FreeTextEntry5] : -psychoeducation about diagnosis and treatment modalities \par - resources: iocdf.org\par -Continue ERP for OCD \par -Lab/other tests: appreciate coordination of care with PMD, TSH screen wnl \par -Medication: Sertraline 75mg \par -Safety:Educated patient of importance of remaining abstinent from drugs and alcohol; Emergency procedures were discussed\par -Patient given opportunity to ask questions and their questions were answered and they expressed understanding and agreement with above plan.\par -Follow up: 3 months

## 2023-03-27 NOTE — PHYSICAL EXAM
[Average] : average [Cooperative] : cooperative [Anxious] : anxious [Full] : full [Clear] : clear [Linear/Goal Directed] : linear/goal directed [None] : none [Preoccupations/Ruminations] : preoccupations/ruminations [Obsessional] : obsessional [Depressive] : depressive [None Reported] : none reported [Above average] : above average [WNL] : within normal limits [FreeTextEntry8] : "better"

## 2023-03-27 NOTE — DISCUSSION/SUMMARY
[FreeTextEntry1] : 28 yo female , working as a nurse, meets criteria for OCD, depression, RAFAL with panic; Protective factors of supportive family and friends, looks to treatment favorably, as such with treatment adherence, prognosis is good.\par  \par

## 2023-03-27 NOTE — SOCIAL HISTORY
[FreeTextEntry1] : raised in [place], live with bio parents and younger brother; attended [employer] through grade school did very well; attended nursing school in [place], "good student"; reports good relationship with family and supportive friends; Has been in 2 previous romantic relationships, none current; \par Substance use: alcohol socially; reports one episode of 2 days of excessive drinking hx bottle of wine a day in context of a romantic breakup without impairment or consequences ; denies any regular drinking currently \par

## 2023-03-27 NOTE — HISTORY OF PRESENT ILLNESS
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, also starting to do more in house behavior experiments with ERP therapist which are difficult; when first started zoloft noted initial mild SE of "slight HA" self resolved with rest and tiredness with nursing night shift work change; Has been attending therapy regularly for last 5 month for OCD and anxiety; Currently working on exposure therapy and tolerating well; Targeting OCD Sx related to contamination and health related reassurance seeking.\par Reports sleep and appetite are good;\par No significant interval events, denies worsening depressive episodes, prefers to continue current  dose.\par  \par  [FreeTextEntry2] : Reviewed, unchanged since last visits  [FreeTextEntry3] : none

## 2023-03-27 NOTE — REASON FOR VISIT
[Home] : at home, [unfilled] , at the time of the visit. [Medical Office: ([facility])___] : at the medical office located in  [Self] : self [Patient] : Patient [FreeTextEntry4] : Patient seen virtually, patient or guardian consents to conduct this via two-way video HIPPA compliant [employer].  [FreeTextEntry1] : OCD, anxiety

## 2023-03-29 ENCOUNTER — NON-APPOINTMENT (OUTPATIENT)
Age: 28
End: 2023-03-29

## 2023-03-29 ENCOUNTER — APPOINTMENT (OUTPATIENT)
Dept: PSYCHIATRY | Facility: CLINIC | Age: 28
End: 2023-03-29

## 2023-03-29 NOTE — DISCUSSION/SUMMARY
[FreeTextEntry1] : Pt returned 's call and reported she had a family emergency and had to go to the hospital. This is why she missed her appt on 3/29/23. Therapist requested her to reschedule for tomorrow if available otherwise will see pt next week.

## 2023-04-05 ENCOUNTER — APPOINTMENT (OUTPATIENT)
Dept: PSYCHIATRY | Facility: CLINIC | Age: 28
End: 2023-04-05

## 2023-04-12 ENCOUNTER — APPOINTMENT (OUTPATIENT)
Dept: PSYCHIATRY | Facility: CLINIC | Age: 28
End: 2023-04-12

## 2023-04-19 ENCOUNTER — APPOINTMENT (OUTPATIENT)
Dept: PSYCHIATRY | Facility: CLINIC | Age: 28
End: 2023-04-19

## 2023-04-21 ENCOUNTER — APPOINTMENT (OUTPATIENT)
Dept: PSYCHIATRY | Facility: CLINIC | Age: 28
End: 2023-04-21
Payer: COMMERCIAL

## 2023-04-21 PROCEDURE — 90837 PSYTX W PT 60 MINUTES: CPT | Mod: 95

## 2023-04-21 NOTE — PLAN
[FreeTextEntry2] : CBT for OCD. [Cognitive and/or Behavior Therapy] : Cognitive and/or Behavior Therapy  [de-identified] : Pt was Ox3. Pt's affect was observed to be euthymic. No risk was observed or reported.\par \par Psychologist met with Pt using teletherapy platform due to COVID-19 pandemic disaster. Pt was at their home, while psychologist was working remotely. Pt continued to provide consent for telehealth services.\par \par Pt did complete HW of exposures related to the bathroom, feet and blood. In session, pt reported great gains with being able to sustain progress over the past few weeks and challenging herself despite gaps in tx appts. Discussed next steps in tx. Agreed to complete highly challenging exposures in session today and next session discuss tx for anxiety and depressive sxs. Exposures completed in session included using the 3rd paper towel roll after washing hands instead of the 7th, ate walnut after being placed on the toilet seat cover, cut and ate a tomato slice without washing hands after touching toilet seat cover and touched dogs toys.. SUDS rated as high. Discussed exposures for the week including booking a vacation when she knows she will have her period. HW assigned for pt to complete exposures. Next session will follow up on exposures related and discuss tx for anxiety ad depressive sxs. [FreeTextEntry1] : Treatment plan reviewed for psycho-education include the following:\par 1. Pt will learn about their symptoms and diagnosis and be able to explain it back to psychologist.\par 2. Pt will learn about the CBT model and be able to accurately categorize their symptoms and diagnosis in terms of cognition, emotional responses, behaviors, and physiological arousal.\par Treatment planning reviewed for exposure and response prevention included the following:\par 1. Pt can benefit from learning/continuing exposure therapy interventions, involving confronting situational, emotional, physiological, and cognitive avoided situations. \par 2. Pt can benefit from reducing and eliminating safety behaviors/objects, such as maladaptive response/ritualization prevention, removing "just in case" items that maintain anxiety, panic, obsessive, and post-traumatic symptoms. \par 3. Exposure therapy involves any or all of these modalities: in vivo, imaginal, interoceptive, Virtual Reality, written/narrative, and analog in vivo exposure\par 4. Exposures will be conducted within psychotherapy sessions and assigned for homework between sessions\par

## 2023-04-26 ENCOUNTER — APPOINTMENT (OUTPATIENT)
Dept: PSYCHIATRY | Facility: CLINIC | Age: 28
End: 2023-04-26

## 2023-05-03 ENCOUNTER — APPOINTMENT (OUTPATIENT)
Dept: PSYCHIATRY | Facility: CLINIC | Age: 28
End: 2023-05-03
Payer: COMMERCIAL

## 2023-05-03 PROCEDURE — 90837 PSYTX W PT 60 MINUTES: CPT | Mod: 95

## 2023-05-03 NOTE — PLAN
[FreeTextEntry2] : CBT for OCD. [Cognitive and/or Behavior Therapy] : Cognitive and/or Behavior Therapy  [de-identified] : Pt was Ox3. Pt's affect was observed to be euthymic. No risk was observed or reported.\par \par Psychologist met with Pt using teletherapy platform due to COVID-19 pandemic disaster. Pt was at their home, while psychologist was working remotely. Pt continued to provide consent for telehealth services.\par \par Pt did complete HW of exposures. In session, reviewed sxs related to anxiety. Pt reported sxs of racing thoughts, heart palpations and used to have nausea. Pt reported sxs daily for a few minutes and twice a month experience more intense sxs last thing the full day. Worries include concerns of cancer, various diseases, family members getting sick, making mistakes at work and hurting pts and worries about having children in the future and managing with OCD. Depressive sxs include decrease mood, anhedonia, decreased motivation and feeling lethargic. Pt reports these feelings approximately twice a month related to difficulty anxiety provoking situation. Explained the cycle of anxiety. Pt expressed understanding.\par Treatment planning reviewed for exposure included the following:\par 1. Pt can benefit from learning/continuing exposure therapy interventions, involving confronting situational, emotional, physiological, and cognitive avoided situations. \par 2. Pt can benefit from reducing and eliminating safety behaviors/objects, such as maladaptive response/ritualization prevention, removing "just in case" items that maintain anxiety, panic, obsessive, and post-traumatic symptoms. \par 3. Exposure therapy involves any or all of these modalities: in vivo, imaginal, interoceptive, Virtual Reality, written/narrative, and analog in vivo exposure\par 4. Exposures will be conducted within psychotherapy sessions and assigned for homework between sessions\par Treatment plan for relaxation techniques reviewed in session included the following:\par 1. Pt can benefit from learning and practicing Relaxation Techniques to reduce physiological arousal, including flight/flight/freeze responses, manifested by muscle tightness or tension; emotions including anxiety and panic, sadness and depression, and anger.\par 2. Relaxation techniques can include any or all of the following: Diaphragmatic Breathing (DB), Progressive Muscle Relaxation (PMR), Imaginal Relaxation (IMR), or mindfulness.\par 3. Relaxation techniques will be learned and practiced within sessions, and assigned for homework between sessions. This will entail interventions such using monitoring forms to track their stress and other symptoms, and assess the efficacy of their implementation of relaxation techniques.\par Treatment plan reviewed for behavior activation:\par 1. Pt can benefit from learning & implementing behavior activation techniques including exercise, socializing, completing tasks and engaging in enjoyable activities.\par pt expressed understanding and was in agreement. 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 HW assigned for pt to complete exposures. Next session will complete YBOCS and create fear hierarchy for anxiety sxs. [FreeTextEntry1] : Treatment plan reviewed for psycho-education include the following:\par 1. Pt will learn about their symptoms and diagnosis and be able to explain it back to psychologist.\par 2. Pt will learn about the CBT model and be able to accurately categorize their symptoms and diagnosis in terms of cognition, emotional responses, behaviors, and physiological arousal.\par Treatment planning reviewed for exposure and response prevention included the following:\par 1. Pt can benefit from learning/continuing exposure therapy interventions, involving confronting situational, emotional, physiological, and cognitive avoided situations. \par 2. Pt can benefit from reducing and eliminating safety behaviors/objects, such as maladaptive response/ritualization prevention, removing "just in case" items that maintain anxiety, panic, obsessive, and post-traumatic symptoms. \par 3. Exposure therapy involves any or all of these modalities: in vivo, imaginal, interoceptive, Virtual Reality, written/narrative, and analog in vivo exposure\par 4. Exposures will be conducted within psychotherapy sessions and assigned for homework between sessions\par

## 2023-05-17 ENCOUNTER — APPOINTMENT (OUTPATIENT)
Dept: PSYCHIATRY | Facility: CLINIC | Age: 28
End: 2023-05-17
Payer: COMMERCIAL

## 2023-05-17 PROCEDURE — 90837 PSYTX W PT 60 MINUTES: CPT | Mod: 95

## 2023-05-17 NOTE — PLAN
[FreeTextEntry2] : CBT for OCD. [Cognitive and/or Behavior Therapy] : Cognitive and/or Behavior Therapy  [de-identified] : Pt was Ox3. Pt's affect was observed to be euthymic. No risk was observed or reported.\par \par Psychologist met with Pt using teletherapy platform due to COVID-19 pandemic disaster. Pt was at their home, while psychologist was working remotely. Pt continued to provide consent for telehealth services.\par \par In session, completed the YBOCS (Total Score=19; Moderate). Reviewed sleep hygiene recommendations including maintaining consistent sleep routine, shifting schedule only slightly on off days and creating a consistent nighttime routine. Reviewed cycle of anxiety. Discussed how to expose her thoughts to anxiety concerns and limit reassurance seeking bxs including internet searching. Pt expressed understanding. HW assigned for pt to create plan for sleep and write script for fear of getting skin cancer. Next session will create fear hierarchy for anxiety sxs and continue with exposures. [FreeTextEntry1] : Treatment plan reviewed for psycho-education include the following:\par 1. Pt will learn about their symptoms and diagnosis and be able to explain it back to psychologist.\par 2. Pt will learn about the CBT model and be able to accurately categorize their symptoms and diagnosis in terms of cognition, emotional responses, behaviors, and physiological arousal.\par Treatment planning reviewed for exposure and response prevention included the following:\par 1. Pt can benefit from learning/continuing exposure therapy interventions, involving confronting situational, emotional, physiological, and cognitive avoided situations. \par 2. Pt can benefit from reducing and eliminating safety behaviors/objects, such as maladaptive response/ritualization prevention, removing "just in case" items that maintain anxiety, panic, obsessive, and post-traumatic symptoms. \par 3. Exposure therapy involves any or all of these modalities: in vivo, imaginal, interoceptive, Virtual Reality, written/narrative, and analog in vivo exposure\par 4. Exposures will be conducted within psychotherapy sessions and assigned for homework between sessions\par

## 2023-05-24 ENCOUNTER — APPOINTMENT (OUTPATIENT)
Dept: PSYCHIATRY | Facility: CLINIC | Age: 28
End: 2023-05-24
Payer: COMMERCIAL

## 2023-05-24 PROCEDURE — 90837 PSYTX W PT 60 MINUTES: CPT | Mod: 95

## 2023-05-24 NOTE — PLAN
[FreeTextEntry2] : CBT for OCD. [Cognitive and/or Behavior Therapy] : Cognitive and/or Behavior Therapy  [de-identified] : Pt was Ox3. Pt's affect was observed to be euthymic. No risk was observed or reported.\par \par Psychologist met with Pt using teletherapy platform due to COVID-19 pandemic disaster. Pt was at their home, while psychologist was working remotely. Pt continued to provide consent for telehealth services.\par \par Pt completed HW of writing script about melanoma. In session, discussed how to adjust sleep routine such that she is typically sleeping 10am-5pm. Reviewed script. Pt correctly added details about spotting an unusually shaped mole, scheduling an appt with her doctor and finding out it is cancerous. With assistance, edited script in session. Pt reread script after each edit. HW assigned for pt to adjust sleep routine and reread script nightly. Next session will continue with exposures. [FreeTextEntry1] : Treatment plan reviewed for psycho-education include the following:\par 1. Pt will learn about their symptoms and diagnosis and be able to explain it back to psychologist.\par 2. Pt will learn about the CBT model and be able to accurately categorize their symptoms and diagnosis in terms of cognition, emotional responses, behaviors, and physiological arousal.\par Treatment planning reviewed for exposure and response prevention included the following:\par 1. Pt can benefit from learning/continuing exposure therapy interventions, involving confronting situational, emotional, physiological, and cognitive avoided situations. \par 2. Pt can benefit from reducing and eliminating safety behaviors/objects, such as maladaptive response/ritualization prevention, removing "just in case" items that maintain anxiety, panic, obsessive, and post-traumatic symptoms. \par 3. Exposure therapy involves any or all of these modalities: in vivo, imaginal, interoceptive, Virtual Reality, written/narrative, and analog in vivo exposure\par 4. Exposures will be conducted within psychotherapy sessions and assigned for homework between sessions\par

## 2023-05-31 ENCOUNTER — APPOINTMENT (OUTPATIENT)
Dept: PSYCHIATRY | Facility: CLINIC | Age: 28
End: 2023-05-31
Payer: COMMERCIAL

## 2023-05-31 PROCEDURE — 90837 PSYTX W PT 60 MINUTES: CPT | Mod: 95

## 2023-05-31 NOTE — PLAN
[FreeTextEntry2] : CBT for OCD. [Cognitive and/or Behavior Therapy] : Cognitive and/or Behavior Therapy  [de-identified] : Pt was Ox3. Pt's affect was observed to be euthymic. No risk was observed or reported.\par \par Psychologist met with Pt using teletherapy platform due to COVID-19 pandemic disaster. Pt was at their home, while psychologist was working remotely. Pt continued to provide consent for telehealth services.\par \par Pt completed HW of rereading script about melanoma. Reported high anxiety and increase in checking and reassurance seeking bxs. In session, discussed the connection between the compulsions and fears. Encouraged pt to limit compulsions. Completed exposures of watching 3 videos about individuals with melanoma. Pt reported moderate-high anxiety. HW assigned for pt to continue to adjust sleep routine and reread script daily and limit compulsions. Next session will continue with exposures. [FreeTextEntry1] : Treatment plan reviewed for psycho-education include the following:\par 1. Pt will learn about their symptoms and diagnosis and be able to explain it back to psychologist.\par 2. Pt will learn about the CBT model and be able to accurately categorize their symptoms and diagnosis in terms of cognition, emotional responses, behaviors, and physiological arousal.\par Treatment planning reviewed for exposure and response prevention included the following:\par 1. Pt can benefit from learning/continuing exposure therapy interventions, involving confronting situational, emotional, physiological, and cognitive avoided situations. \par 2. Pt can benefit from reducing and eliminating safety behaviors/objects, such as maladaptive response/ritualization prevention, removing "just in case" items that maintain anxiety, panic, obsessive, and post-traumatic symptoms. \par 3. Exposure therapy involves any or all of these modalities: in vivo, imaginal, interoceptive, Virtual Reality, written/narrative, and analog in vivo exposure\par 4. Exposures will be conducted within psychotherapy sessions and assigned for homework between sessions\par

## 2023-06-07 ENCOUNTER — APPOINTMENT (OUTPATIENT)
Dept: PSYCHIATRY | Facility: CLINIC | Age: 28
End: 2023-06-07
Payer: COMMERCIAL

## 2023-06-07 PROCEDURE — 90834 PSYTX W PT 45 MINUTES: CPT | Mod: 95

## 2023-06-07 NOTE — PLAN
[FreeTextEntry2] : CBT for OCD. [Cognitive and/or Behavior Therapy] : Cognitive and/or Behavior Therapy  [de-identified] : Pt was Ox3. Pt's affect was observed to be euthymic. No risk was observed or reported.\par \par Psychologist met with Pt using teletherapy platform due to COVID-19 pandemic disaster. Pt was at their home, while psychologist was working remotely. Pt continued to provide consent for telehealth services.\par \par Pt reported her parents have tested positive for Covid and are symptomatic. Pt reported on increased compulsions related to contamination. Discussed compulsions to limit including wearing gloves, washing after she touches areas, hand washing past 20 seconds and doing her laundry typically. Pt agreed to go to work so long as she tests negative before her shift and will wear regular surgical mask as recommended. Agreed to decrease extra precautions after Saturday so long as pt continues to test negative for Covid. HW assigned for pt to continue to adjust sleep routine and limit compulsions. Next session will continue with exposures. [FreeTextEntry1] : Treatment plan reviewed for psycho-education include the following:\par 1. Pt will learn about their symptoms and diagnosis and be able to explain it back to psychologist.\par 2. Pt will learn about the CBT model and be able to accurately categorize their symptoms and diagnosis in terms of cognition, emotional responses, behaviors, and physiological arousal.\par Treatment planning reviewed for exposure and response prevention included the following:\par 1. Pt can benefit from learning/continuing exposure therapy interventions, involving confronting situational, emotional, physiological, and cognitive avoided situations. \par 2. Pt can benefit from reducing and eliminating safety behaviors/objects, such as maladaptive response/ritualization prevention, removing "just in case" items that maintain anxiety, panic, obsessive, and post-traumatic symptoms. \par 3. Exposure therapy involves any or all of these modalities: in vivo, imaginal, interoceptive, Virtual Reality, written/narrative, and analog in vivo exposure\par 4. Exposures will be conducted within psychotherapy sessions and assigned for homework between sessions\par

## 2023-06-14 ENCOUNTER — APPOINTMENT (OUTPATIENT)
Dept: PSYCHIATRY | Facility: CLINIC | Age: 28
End: 2023-06-14
Payer: COMMERCIAL

## 2023-06-14 PROCEDURE — 90834 PSYTX W PT 45 MINUTES: CPT | Mod: 95

## 2023-06-14 NOTE — PLAN
[FreeTextEntry2] : CBT for OCD. [Cognitive and/or Behavior Therapy] : Cognitive and/or Behavior Therapy  [de-identified] : Pt was Ox3. Pt's affect was observed to be euthymic. No risk was observed or reported.\par \par Psychologist met with Pt using teletherapy platform due to COVID-19 pandemic disaster. Pt was at their home, while psychologist was working remotely. Pt continued to provide consent for telehealth services.\par \par Pt reported she did complete exposures of minimizing compulsions and reassurance seeking bx related to Covid concerns. Increased handwashing remains. In session, monitored sleep habits. Encouraged pt to maintain sleep log. Completed exposures related to lymphoma. Pt spoke about the disease and answered questions. SUDS rated as moderate. HW assigned for pt to write script related to lymphoma. Next session will continue with exposures. [FreeTextEntry1] : Treatment plan reviewed for psycho-education include the following:\par 1. Pt will learn about their symptoms and diagnosis and be able to explain it back to psychologist.\par 2. Pt will learn about the CBT model and be able to accurately categorize their symptoms and diagnosis in terms of cognition, emotional responses, behaviors, and physiological arousal.\par Treatment planning reviewed for exposure and response prevention included the following:\par 1. Pt can benefit from learning/continuing exposure therapy interventions, involving confronting situational, emotional, physiological, and cognitive avoided situations. \par 2. Pt can benefit from reducing and eliminating safety behaviors/objects, such as maladaptive response/ritualization prevention, removing "just in case" items that maintain anxiety, panic, obsessive, and post-traumatic symptoms. \par 3. Exposure therapy involves any or all of these modalities: in vivo, imaginal, interoceptive, Virtual Reality, written/narrative, and analog in vivo exposure\par 4. Exposures will be conducted within psychotherapy sessions and assigned for homework between sessions\par

## 2023-06-21 ENCOUNTER — APPOINTMENT (OUTPATIENT)
Dept: PSYCHIATRY | Facility: CLINIC | Age: 28
End: 2023-06-21
Payer: COMMERCIAL

## 2023-06-21 PROCEDURE — 90837 PSYTX W PT 60 MINUTES: CPT | Mod: 95

## 2023-06-21 NOTE — PLAN
[FreeTextEntry2] : CBT for OCD. [Cognitive and/or Behavior Therapy] : Cognitive and/or Behavior Therapy  [de-identified] : Pt was Ox3. Pt's affect was observed to be euthymic. No risk was observed or reported.\par \par Psychologist met with Pt using teletherapy platform due to COVID-19 pandemic disaster. Pt was at their home, while psychologist was working remotely. Pt continued to provide consent for telehealth services.\par \par Pt reported she did complete exposure of beginning script about lymphoma. In session, monitored sleep habits. Encouraged pt to maintain sleep log. Added to script about lymphoma as an exposure exercise. SUDS rated as 8/10. Pt reported her cousin was diagnosed with thyroid cance this week which increased her own checking bx. Discussed decreasing checking bx of thyroid. HW assigned for pt to add to script related to lymphoma. Next session will continue with exposures. [FreeTextEntry1] : Treatment plan reviewed for psycho-education include the following:\par 1. Pt will learn about their symptoms and diagnosis and be able to explain it back to psychologist.\par 2. Pt will learn about the CBT model and be able to accurately categorize their symptoms and diagnosis in terms of cognition, emotional responses, behaviors, and physiological arousal.\par Treatment planning reviewed for exposure and response prevention included the following:\par 1. Pt can benefit from learning/continuing exposure therapy interventions, involving confronting situational, emotional, physiological, and cognitive avoided situations. \par 2. Pt can benefit from reducing and eliminating safety behaviors/objects, such as maladaptive response/ritualization prevention, removing "just in case" items that maintain anxiety, panic, obsessive, and post-traumatic symptoms. \par 3. Exposure therapy involves any or all of these modalities: in vivo, imaginal, interoceptive, Virtual Reality, written/narrative, and analog in vivo exposure\par 4. Exposures will be conducted within psychotherapy sessions and assigned for homework between sessions\par

## 2023-07-05 ENCOUNTER — APPOINTMENT (OUTPATIENT)
Dept: PSYCHIATRY | Facility: CLINIC | Age: 28
End: 2023-07-05
Payer: COMMERCIAL

## 2023-07-05 PROCEDURE — 90837 PSYTX W PT 60 MINUTES: CPT | Mod: 95

## 2023-07-05 NOTE — PLAN
[FreeTextEntry2] : CBT for OCD. [Cognitive and/or Behavior Therapy] : Cognitive and/or Behavior Therapy  [de-identified] : Pt was Ox3. Pt's affect was observed to be euthymic. No risk was observed or reported.\par \par Psychologist met with Pt using teletherapy platform due to COVID-19 pandemic disaster. Pt was at their home, while psychologist was working remotely. Pt continued to provide consent for telehealth services.\par \par Pt reported she did complete exposure of completing script about lymphoma. In session, pt reported high anxiety about upcoming trip. Pt reported OCD sxs returning related to fear that her airplane would go down or contamination. Discussed resisting compulsions, specifically doing certain tasks in certain numbers. Completed exposure of watching video of walking through an airport terminal. Reviewed diaphragmatic breathing and [address] grounding technique. Discussed noticing intrusive thoughts, leaning into them and not letting them rule her behavior. HW assigned for pt to continue exposures and resist compulsions. Next session will continue with exposures. [FreeTextEntry1] : Treatment plan reviewed for psycho-education include the following:\par 1. Pt will learn about their symptoms and diagnosis and be able to explain it back to psychologist.\par 2. Pt will learn about the CBT model and be able to accurately categorize their symptoms and diagnosis in terms of cognition, emotional responses, behaviors, and physiological arousal.\par Treatment planning reviewed for exposure and response prevention included the following:\par 1. Pt can benefit from learning/continuing exposure therapy interventions, involving confronting situational, emotional, physiological, and cognitive avoided situations. \par 2. Pt can benefit from reducing and eliminating safety behaviors/objects, such as maladaptive response/ritualization prevention, removing "just in case" items that maintain anxiety, panic, obsessive, and post-traumatic symptoms. \par 3. Exposure therapy involves any or all of these modalities: in vivo, imaginal, interoceptive, Virtual Reality, written/narrative, and analog in vivo exposure\par 4. Exposures will be conducted within psychotherapy sessions and assigned for homework between sessions\par

## 2023-07-12 ENCOUNTER — APPOINTMENT (OUTPATIENT)
Dept: PSYCHIATRY | Facility: CLINIC | Age: 28
End: 2023-07-12
Payer: COMMERCIAL

## 2023-07-12 PROCEDURE — 90837 PSYTX W PT 60 MINUTES: CPT | Mod: 95

## 2023-07-12 NOTE — PLAN
[FreeTextEntry2] : CBT for OCD. [Cognitive and/or Behavior Therapy] : Cognitive and/or Behavior Therapy  [de-identified] : Pt was Ox3. Pt's affect was observed to be euthymic. No risk was observed or reported.\par \par Psychologist met with Pt using teletherapy platform due to COVID-19 pandemic disaster. Pt was at their home, while psychologist was working remotely. Pt continued to provide consent for telehealth services.\par \par In session, pt reported on her trip. Reported high anxiety when walking through the airport and on the flight there, mild anxiety on the flight back and high anxiety when in the airport when returning. Pt reported noticing OCD sxs increasing when she was on vacation. Pt compromised with the compulsions she wanted to complete. Completed exposures of touching her shoes to her dog's toy, placing washing clothes from vacation in her closet and washed scrub pants on her bed. SUDS ranged from 5-7/10. Encouraged pt to continue fighting urge to give into compulsions. HW assigned for pt to resist compulsions, place worn lounge wear on her bed, fold and place laundered clothes into her clothes without additional washing and sleeping in sheets as is. Next session will continue with exposures. [FreeTextEntry1] : Treatment plan reviewed for psycho-education include the following:\par 1. Pt will learn about their symptoms and diagnosis and be able to explain it back to psychologist.\par 2. Pt will learn about the CBT model and be able to accurately categorize their symptoms and diagnosis in terms of cognition, emotional responses, behaviors, and physiological arousal.\par Treatment planning reviewed for exposure and response prevention included the following:\par 1. Pt can benefit from learning/continuing exposure therapy interventions, involving confronting situational, emotional, physiological, and cognitive avoided situations. \par 2. Pt can benefit from reducing and eliminating safety behaviors/objects, such as maladaptive response/ritualization prevention, removing "just in case" items that maintain anxiety, panic, obsessive, and post-traumatic symptoms. \par 3. Exposure therapy involves any or all of these modalities: in vivo, imaginal, interoceptive, Virtual Reality, written/narrative, and analog in vivo exposure\par 4. Exposures will be conducted within psychotherapy sessions and assigned for homework between sessions\par

## 2023-07-12 NOTE — PLAN
[FreeTextEntry2] : CBT for OCD. [Cognitive and/or Behavior Therapy] : Cognitive and/or Behavior Therapy  [de-identified] : Pt was Ox3. Pt's affect was observed to be euthymic. No risk was observed or reported.\par \par Psychologist met with Pt using teletherapy platform due to COVID-19 pandemic disaster. Pt was at their home, while psychologist was working remotely. Pt continued to provide consent for telehealth services.\par \par In session, pt reported on her trip. Reported high anxiety when walking through the airport and on the flight there, mild anxiety on the flight back and high anxiety when in the airport when returning. Pt reported noticing OCD sxs increasing when she was on vacation. Pt compromised with the compulsions she wanted to complete. Completed exposures of touching her shoes to her dog's toy, placing washing clothes from vacation in her closet and washed scrub pants on her bed. SUDS ranged from 5-7/10. Encouraged pt to continue fighting urge to give into compulsions. HW assigned for pt to resist compulsions, place worn lounge wear on her bed, fold and place laundered clothes into her clothes without additional washing and sleeping in sheets as is. Next session will continue with exposures. [FreeTextEntry1] : Treatment plan reviewed for psycho-education include the following:\par 1. Pt will learn about their symptoms and diagnosis and be able to explain it back to psychologist.\par 2. Pt will learn about the CBT model and be able to accurately categorize their symptoms and diagnosis in terms of cognition, emotional responses, behaviors, and physiological arousal.\par Treatment planning reviewed for exposure and response prevention included the following:\par 1. Pt can benefit from learning/continuing exposure therapy interventions, involving confronting situational, emotional, physiological, and cognitive avoided situations. \par 2. Pt can benefit from reducing and eliminating safety behaviors/objects, such as maladaptive response/ritualization prevention, removing "just in case" items that maintain anxiety, panic, obsessive, and post-traumatic symptoms. \par 3. Exposure therapy involves any or all of these modalities: in vivo, imaginal, interoceptive, Virtual Reality, written/narrative, and analog in vivo exposure\par 4. Exposures will be conducted within psychotherapy sessions and assigned for homework between sessions\par

## 2023-07-19 ENCOUNTER — APPOINTMENT (OUTPATIENT)
Dept: PSYCHIATRY | Facility: CLINIC | Age: 28
End: 2023-07-19

## 2023-07-19 ENCOUNTER — NON-APPOINTMENT (OUTPATIENT)
Age: 28
End: 2023-07-19

## 2023-07-20 NOTE — DISCUSSION/SUMMARY
[FreeTextEntry1] : Pt replied to therapist's email at 4:05pm on 7/19/23:\par "Hi Dr. Victoria. I’m sorry that I missed our appointment today. I called the  around 9am this morning to say I wasn’t feeling well. I left a message asking to reschedule my appointment and to give me a call back. I have been trying to sleep this off all day and didn’t hear my phone ring when you called during my appointment time. I’m sorry that the message didn’t reach you and I’m sorry for any inconvenience "\par \par  staff emailed pt at 10:50am on 7/20/23 asking if she can reschedule her appt.

## 2023-08-01 ENCOUNTER — NON-APPOINTMENT (OUTPATIENT)
Age: 28
End: 2023-08-01

## 2023-08-01 ENCOUNTER — APPOINTMENT (OUTPATIENT)
Dept: PSYCHIATRY | Facility: CLINIC | Age: 28
End: 2023-08-01

## 2023-08-03 ENCOUNTER — NON-APPOINTMENT (OUTPATIENT)
Age: 28
End: 2023-08-03

## 2023-08-22 ENCOUNTER — APPOINTMENT (OUTPATIENT)
Dept: PSYCHIATRY | Facility: CLINIC | Age: 28
End: 2023-08-22
Payer: COMMERCIAL

## 2023-08-22 DIAGNOSIS — F41.0 GENERALIZED ANXIETY DISORDER: ICD-10-CM

## 2023-08-22 DIAGNOSIS — F32.1 MAJOR DEPRESSIVE DISORDER, SINGLE EPISODE, MODERATE: ICD-10-CM

## 2023-08-22 DIAGNOSIS — F43.21 ADJUSTMENT DISORDER WITH DEPRESSED MOOD: ICD-10-CM

## 2023-08-22 DIAGNOSIS — F41.1 GENERALIZED ANXIETY DISORDER: ICD-10-CM

## 2023-08-22 PROCEDURE — 99214 OFFICE O/P EST MOD 30 MIN: CPT

## 2023-08-22 RX ORDER — SERTRALINE HYDROCHLORIDE 100 MG/1
100 TABLET, FILM COATED ORAL
Qty: 90 | Refills: 0 | Status: ACTIVE | COMMUNITY
Start: 2023-08-22 | End: 1900-01-01

## 2023-08-22 NOTE — SOCIAL HISTORY
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

## 2023-08-22 NOTE — DISCUSSION/SUMMARY
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

## 2023-08-22 NOTE — HISTORY OF PRESENT ILLNESS
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    Pt explains she's always been an anxious person, recalls significant separation anxiety in elementary school through 4th grade. At that time parent sought help from [facility] counselor for help with separation anxiety coping which pt found helpful; Reports in middle and high school improvement, however continued to recall "stressful time" in school regarding academic high achievement and test taking anxiety; Pt did very well in school without any additional educational supports, then attended nursing school in [place] where again did very well however recalls this again as a very stressful time. 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 Additional this summer broke up with boyfriend of 8 months, and work related stressors; Adds she has supports at home and work however found herself crying easily "worrying about everything all the time" and describes panic attacks becoming more frequent, last occurred 2 days ago. She noted over last few months started to isolate herself, loss of interests in hangout out with friends or family and close coworkers noted her increase in excessive handwashing behaviors. Pt sought online therapy help with hopes of starting CBT format soon, currently only supportive talk therapy, pt is interested in [facility] erp referral;  Pt denies hx of psychosis/ladonna/abuse/ED/ADHD/substance abuse.   [FreeTextEntry2] : Reviewed, unchanged since last visits  [FreeTextEntry3] : none

## 2023-08-22 NOTE — PLAN
[No] : No [Medication education provided] : Medication education provided. [Rationale for medication choices, possible risks/precautions, benefits, alternative treatment choices, and consequences of non-treatment discussed] : Rationale for medication choices, possible risks/precautions, benefits, alternative treatment choices, and consequences of non-treatment discussed with patient/family/caregiver  [FreeTextEntry5] : -psychoeducation about diagnosis and treatment modalities - resources: iocdf.org -Continue ERP for OCD -Lab/other tests: appreciate coordination of care with PMD, TSH screen wnl -Medication:  Inc Sertraline to 100 mg -Safety:Educated patient of importance of remaining abstinent from drugs and alcohol; Emergency procedures were discussed -Patient given opportunity to ask questions and their questions were answered and they expressed understanding and agreement with above plan.

## 2023-08-25 ENCOUNTER — APPOINTMENT (OUTPATIENT)
Dept: PSYCHIATRY | Facility: CLINIC | Age: 28
End: 2023-08-25
Payer: COMMERCIAL

## 2023-08-25 PROCEDURE — 90837 PSYTX W PT 60 MINUTES: CPT | Mod: 95

## 2023-08-25 NOTE — PLAN
[FreeTextEntry2] : CBT for OCD. [Cognitive and/or Behavior Therapy] : Cognitive and/or Behavior Therapy  [de-identified] : Pt was Ox3. Pt's affect was observed to be euthymic. No risk was observed or reported.  Psychologist met with Pt using teletherapy platform due to COVID-19 pandemic disaster. Pt was at their home, while psychologist was working remotely. Pt continued to provide consent for telehealth services.  In session, pt reported high increase in her sxs. Reported increased sxs due to complicated pt in the hospital that pt took care of any pt having Covid and worrying about spreading germs to her family. Used Socratic questioning to discuss how pt can continue to fight sxs and not give into OCD. Discussed adding positive experiences. Agreed to get a pedicure, go out to dinner with a friend and go back to the gym. Discussed seeking support from group and podcasts. HW assigned for pt to resist compulsions and add in positive events. Next session will continue with exposures. [FreeTextEntry1] : Treatment plan reviewed for psycho-education include the following:\par  1. Pt will learn about their symptoms and diagnosis and be able to explain it back to psychologist.\par  2. Pt will learn about the CBT model and be able to accurately categorize their symptoms and diagnosis in terms of cognition, emotional responses, behaviors, and physiological arousal.\par  Treatment planning reviewed for exposure and response prevention included the following:\par  1. Pt can benefit from learning/continuing exposure therapy interventions, involving confronting situational, emotional, physiological, and cognitive avoided situations. \par  2. Pt can benefit from reducing and eliminating safety behaviors/objects, such as maladaptive response/ritualization prevention, removing "just in case" items that maintain anxiety, panic, obsessive, and post-traumatic symptoms. \par  3. Exposure therapy involves any or all of these modalities: in vivo, imaginal, interoceptive, Virtual Reality, written/narrative, and analog in vivo exposure\par  4. Exposures will be conducted within psychotherapy sessions and assigned for homework between sessions\par

## 2023-08-29 ENCOUNTER — NON-APPOINTMENT (OUTPATIENT)
Age: 28
End: 2023-08-29

## 2023-08-30 ENCOUNTER — APPOINTMENT (OUTPATIENT)
Dept: PSYCHIATRY | Facility: CLINIC | Age: 28
End: 2023-08-30
Payer: COMMERCIAL

## 2023-08-30 PROCEDURE — 90837 PSYTX W PT 60 MINUTES: CPT | Mod: 95

## 2023-08-30 NOTE — PLAN
[Cognitive and/or Behavior Therapy] : Cognitive and/or Behavior Therapy  [FreeTextEntry2] : CBT for OCD. [de-identified] : Pt was Ox3. Pt's affect was observed to be euthymic. No risk was observed or reported.  Psychologist met with Pt using teletherapy platform due to COVID-19 pandemic disaster. Pt was at their home, while psychologist was working remotely. Pt continued to provide consent for telehealth services.  Pt completed HW of returning to the gym and getting a pedicure. Scheduled plans for that night with friends. In session, pt reported her sxs have leveled out since last session. Pt completed exposures of touching areas around her house with her elbow that was bleeding yesterday. Pt reported moderate level anxiety. Discussed how to create behavioral contract with her mom as mother is reportedly upset with pt that she continues to leave the bathroom sink wet after washing and leaves the bathroom garbage pail full. Discussed how to have a conversation with her mom about the agreement. HW assigned for pt to resist compulsions and add in positive events. Next session will continue with exposures. [FreeTextEntry1] : Treatment plan reviewed for psycho-education include the following:\par  1. Pt will learn about their symptoms and diagnosis and be able to explain it back to psychologist.\par  2. Pt will learn about the CBT model and be able to accurately categorize their symptoms and diagnosis in terms of cognition, emotional responses, behaviors, and physiological arousal.\par  Treatment planning reviewed for exposure and response prevention included the following:\par  1. Pt can benefit from learning/continuing exposure therapy interventions, involving confronting situational, emotional, physiological, and cognitive avoided situations. \par  2. Pt can benefit from reducing and eliminating safety behaviors/objects, such as maladaptive response/ritualization prevention, removing "just in case" items that maintain anxiety, panic, obsessive, and post-traumatic symptoms. \par  3. Exposure therapy involves any or all of these modalities: in vivo, imaginal, interoceptive, Virtual Reality, written/narrative, and analog in vivo exposure\par  4. Exposures will be conducted within psychotherapy sessions and assigned for homework between sessions\par

## 2023-09-06 ENCOUNTER — APPOINTMENT (OUTPATIENT)
Dept: PSYCHIATRY | Facility: CLINIC | Age: 28
End: 2023-09-06
Payer: COMMERCIAL

## 2023-09-06 PROCEDURE — 90837 PSYTX W PT 60 MINUTES: CPT | Mod: 95

## 2023-09-06 NOTE — PLAN
[FreeTextEntry2] : CBT for OCD. [Cognitive and/or Behavior Therapy] : Cognitive and/or Behavior Therapy  [de-identified] : Pt was Ox3. Pt's affect was observed to be euthymic. No risk was observed or reported.  Psychologist met with Pt using teletherapy platform due to COVID-19 pandemic disaster. Pt was at their home, while psychologist was working remotely. Pt continued to provide consent for telehealth services.  Pt completed HW of discussing how to manage compulsions related to the bathroom with her mom and reached an agreement. pt did complete exposure of wiping up water from the bathroom sink and emptying the bathroom trash more frequently. Discussed one challenging situations from the week when pt showered 3x after doing laundry with her scrubs. Discussed recruiting supporters. Explained the different between reassurance seeking and getting support to help fight OCD. Revisited idea of attending support or maintenance group. Discussed transitioning medication management to [facility] to be able to access their center's maintenance group. Pt was interested. Discussed beginning tx review. Pt was interested. HW assigned for pt to resist compulsions, add in positive events and review cycle of OCD and ERP. Next session will continue with exposures and tx review. [FreeTextEntry1] : Treatment plan reviewed for psycho-education include the following:\par  1. Pt will learn about their symptoms and diagnosis and be able to explain it back to psychologist.\par  2. Pt will learn about the CBT model and be able to accurately categorize their symptoms and diagnosis in terms of cognition, emotional responses, behaviors, and physiological arousal.\par  Treatment planning reviewed for exposure and response prevention included the following:\par  1. Pt can benefit from learning/continuing exposure therapy interventions, involving confronting situational, emotional, physiological, and cognitive avoided situations. \par  2. Pt can benefit from reducing and eliminating safety behaviors/objects, such as maladaptive response/ritualization prevention, removing "just in case" items that maintain anxiety, panic, obsessive, and post-traumatic symptoms. \par  3. Exposure therapy involves any or all of these modalities: in vivo, imaginal, interoceptive, Virtual Reality, written/narrative, and analog in vivo exposure\par  4. Exposures will be conducted within psychotherapy sessions and assigned for homework between sessions\par

## 2023-09-14 ENCOUNTER — APPOINTMENT (OUTPATIENT)
Dept: PSYCHIATRY | Facility: CLINIC | Age: 28
End: 2023-09-14
Payer: COMMERCIAL

## 2023-09-14 PROCEDURE — 90837 PSYTX W PT 60 MINUTES: CPT | Mod: 95

## 2023-09-20 ENCOUNTER — APPOINTMENT (OUTPATIENT)
Dept: PSYCHIATRY | Facility: CLINIC | Age: 28
End: 2023-09-20
Payer: COMMERCIAL

## 2023-09-20 PROCEDURE — 90837 PSYTX W PT 60 MINUTES: CPT | Mod: 95

## 2023-09-27 ENCOUNTER — APPOINTMENT (OUTPATIENT)
Dept: PSYCHIATRY | Facility: CLINIC | Age: 28
End: 2023-09-27
Payer: COMMERCIAL

## 2023-09-27 PROCEDURE — 90834 PSYTX W PT 45 MINUTES: CPT | Mod: GT

## 2023-10-04 ENCOUNTER — APPOINTMENT (OUTPATIENT)
Dept: PSYCHIATRY | Facility: CLINIC | Age: 28
End: 2023-10-04
Payer: COMMERCIAL

## 2023-10-04 DIAGNOSIS — F42.9 OBSESSIVE-COMPULSIVE DISORDER, UNSPECIFIED: ICD-10-CM

## 2023-10-04 PROCEDURE — 90834 PSYTX W PT 45 MINUTES: CPT | Mod: GT

## 2024-02-08 NOTE — DATA REVIEWED
MD at bedside.   
[No studies available for review at this time] : No studies available for review at this time
02-Feb-2024

## 2024-04-04 DIAGNOSIS — Z00.00 ENCOUNTER FOR GENERAL ADULT MEDICAL EXAMINATION W/OUT ABNORMAL FINDINGS: ICD-10-CM

## 2024-04-10 ENCOUNTER — APPOINTMENT (OUTPATIENT)
Dept: INTERNAL MEDICINE | Facility: CLINIC | Age: 29
End: 2024-04-10

## 2024-07-19 ENCOUNTER — APPOINTMENT (OUTPATIENT)
Dept: UROLOGY | Facility: IMAGING CENTER | Age: 29
End: 2024-07-19

## 2025-01-06 ENCOUNTER — NON-APPOINTMENT (OUTPATIENT)
Age: 30
End: 2025-01-06

## 2025-05-14 ENCOUNTER — NON-APPOINTMENT (OUTPATIENT)
Age: 30
End: 2025-05-14